# Patient Record
Sex: MALE | Race: WHITE | NOT HISPANIC OR LATINO | Employment: FULL TIME | URBAN - METROPOLITAN AREA
[De-identification: names, ages, dates, MRNs, and addresses within clinical notes are randomized per-mention and may not be internally consistent; named-entity substitution may affect disease eponyms.]

---

## 2017-01-12 ENCOUNTER — OFFICE VISIT (OUTPATIENT)
Dept: MEDICAL GROUP | Facility: MEDICAL CENTER | Age: 42
End: 2017-01-12
Payer: COMMERCIAL

## 2017-01-12 VITALS
HEIGHT: 75 IN | SYSTOLIC BLOOD PRESSURE: 128 MMHG | OXYGEN SATURATION: 98 % | RESPIRATION RATE: 16 BRPM | WEIGHT: 240 LBS | DIASTOLIC BLOOD PRESSURE: 76 MMHG | HEART RATE: 76 BPM | BODY MASS INDEX: 29.84 KG/M2 | TEMPERATURE: 97.8 F

## 2017-01-12 DIAGNOSIS — F41.9 ANXIETY: ICD-10-CM

## 2017-01-12 DIAGNOSIS — R21 RASH: ICD-10-CM

## 2017-01-12 PROCEDURE — 99214 OFFICE O/P EST MOD 30 MIN: CPT | Performed by: NURSE PRACTITIONER

## 2017-01-12 RX ORDER — SULFAMETHOXAZOLE AND TRIMETHOPRIM 800; 160 MG/1; MG/1
1 TABLET ORAL 2 TIMES DAILY
Qty: 20 TAB | Refills: 0 | Status: SHIPPED | OUTPATIENT
Start: 2017-01-12 | End: 2019-01-21

## 2017-01-12 RX ORDER — TRIAMCINOLONE ACETONIDE 1 MG/G
2-4 CREAM TOPICAL 2 TIMES DAILY
Qty: 1 TUBE | Refills: 0 | Status: SHIPPED | OUTPATIENT
Start: 2017-01-12 | End: 2019-01-21

## 2017-01-12 ASSESSMENT — PATIENT HEALTH QUESTIONNAIRE - PHQ9: CLINICAL INTERPRETATION OF PHQ2 SCORE: 0

## 2017-01-12 NOTE — PROGRESS NOTES
"Subjective:     Chief Complaint   Patient presents with   • rash     poss infection     Gabriel Blair is a 41 y.o. male established patient here for evaluation of rash. One week ago he started developing scattered raised red lesions that are mildly irritated and pruritic. He has started a new dietary supplement but otherwise has no new exposures including hygiene products or foods. He states that approximately 15 years ago he developed a MRSA infection that had similar appearance and required long-term antibiotics to resolve. He is concerned that the current lesions indicate return of infection. No fever, nausea, pain, malaise. No open lesions or discharge  He has been feeling quite anxious lately. He has had long-standing history of anxiety which she is managed with exercise and counseling. 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 Denies significant depression, insomnia, hypersomnia, appetite change    Current medicines (including changes today)  Current Outpatient Prescriptions   Medication Sig Dispense Refill   • sulfamethoxazole-trimethoprim (BACTRIM DS) 800-160 MG tablet Take 1 Tab by mouth 2 times a day. 20 Tab 0   • triamcinolone acetonide (KENALOG) 0.1 % Cream Apply 2-4 g to affected area(s) 2 times a day. 1 Tube 0   • acetaminophen-isometheptene-dichloralphonazone (MIDRIN) -325 MG Cap Take 1-2 Caps by mouth every 8 hours as needed. 30 Cap 0     No current facility-administered medications for this visit.     He  has a past medical history of Allergy; Migraine; and Asthma.    ROS included above     Objective:     Blood pressure 128/76, pulse 76, temperature 36.6 °C (97.8 °F), resp. rate 16, height 1.905 m (6' 3\"), weight 108.863 kg (240 lb), SpO2 98 %. Body mass index is 30 kg/(m^2).     Physical Exam:  General: Alert, oriented in no acute distress.  Eye contact is good, speech is normal, affect calm  Lungs: clear to auscultation bilaterally, normal effort, " no wheeze/ rhonchi/ rales.  CV: regular rate and rhythm, S1, S2, no murmur  Ext: no edema. Scattered erythematous papules primarily on the trunk and bilateral arms. No fluid-filled lesions, no drainage, no tenderness    Assessment and Plan:   The following treatment plan was discussed   1. Rash   scattered red papules, no vesicular lesions, no open lesions or drainage. Discussed with him that this rash appears more consistent with dermatitis then infectious cause such as cellulitis. He has, however, very concerned due to his history of MRSA infection. I will provide him with a course of Bactrim. He may also try Kenalog. Advised to stop the new dietary supplement for the next few weeks. Avoid overuse of soap, hot water. Start using that daily moisturizer like Eucerin. Let me know if not gradually resolving  sulfamethoxazole-trimethoprim (BACTRIM DS) 800-160 MG tablet       2.       Anxiety triamcinolone acetonide (KENALOG) 0.1 % Cream  Anxiety may be a significant component today. Encouraged to continue with regular exercise and stress relief efforts. Follow-up if needing further assistance        Followup: as needed         Please note that this dictation was created using voice recognition software. I have worked with consultants from the vendor as well as technical experts from [employer] [facility] [employer] to optimize the interface. I have made every reasonable attempt to correct obvious errors, but I expect that there are errors of grammar and possibly content that I did not discover before finalizing the note.

## 2017-01-12 NOTE — MR AVS SNAPSHOT
"        Gabriel Blair   2017 10:40 AM   Office Visit   MRN: [id number]    Department:  [facility]   Dept Phone:  [phone]    Description:  Male : 1975   Provider:  LILLIAN Bender           Reason for Visit     Laceration poss infection      Allergies as of 2017     Allergen Noted Reactions    Pollen Extract 2016   Rash, Itching, Swelling    Break out on arms      You were diagnosed with     Rash   [[id number]]         Vital Signs     Blood Pressure Pulse Temperature Respirations Height Weight    128/76 mmHg 76 36.6 °C (97.8 °F) 16 1.905 m (6' 3\") 108.863 kg (240 lb)    Body Mass Index Oxygen Saturation Smoking Status             30.00 kg/m2 98% Never Smoker          Basic Information     Date Of Birth Sex Race Ethnicity Preferred Language    1975 Male White Non- English      Problem List              ICD-10-CM Priority Class Noted - Resolved    Mild intermittent asthma without complication J45.20   2016 - Present    Intractable migraine with aura without status migrainosus G43.119   2016 - Present    Obesity (BMI 30-39.9) E66.9   2016 - Present      Health Maintenance        Date Due Completion Dates    IMM PNEUMOCOCCAL 19-64 (ADULT) MEDIUM RISK SERIES (1 of 1 - PPSV23) 1996 ---    IMM INFLUENZA (1) 2016 ---    IMM DTaP/Tdap/Td Vaccine (2 - Td) 2024            Current Immunizations     Tdap Vaccine 2014      Below and/or attached are the medications your provider expects you to take. Review all of your home medications and newly ordered medications with your provider and/or pharmacist. Follow medication instructions as directed by your provider and/or pharmacist. Please keep your medication list with you and share with your provider. Update the information when medications are discontinued, doses are changed, or new medications (including over-the-counter products) are added; and carry medication information at all " times in the event of emergency situations     Allergies:  POLLEN EXTRACT - Rash,Itching,Swelling               Medications  Valid as of: January 12, 2017 - 10:40 AM    Generic Name Brand Name Tablet Size Instructions for use    Isometheptene-Dichloral-APAP (Cap) MIDRIN -325 MG Take 1-2 Caps by mouth every 8 hours as needed.        Sulfamethoxazole-Trimethoprim (Tab) BACTRIM -160 MG Take 1 Tab by mouth 2 times a day.        Triamcinolone Acetonide (Cream) KENALOG 0.1 % Apply 2-4 g to affected area(s) 2 times a day.        .                 Medicines prescribed today were sent to:     [facility] [address] [place] [address], [place] - [address] [facility]    [address] [facility] [address]    Phone: [phone] Fax: [phone]    Open 24 Hours?: No      Medication refill instructions:       If your prescription bottle indicates you have medication refills left, it is not necessary to call your provider’s office. Please contact your pharmacy and they will refill your medication.    If your prescription bottle indicates you do not have any refills left, you may request refills at any time through one of the following ways: The online [employer] system (except Urgent Care), by calling your provider’s office, or by asking your pharmacy to contact your provider’s office with a refill request. Medication refills are processed only during regular business hours and may not be available until the next business day. Your provider may request additional information or to have a follow-up visit with you prior to refilling your medication.   *Please Note: Medication refills are assigned a new Rx number when refilled electronically. Your pharmacy may indicate that no refills were authorized even though a new prescription for the same medication is available at the pharmacy. Please request the medicine by name with the pharmacy before contacting your provider for a refill.            [employer] Access Code: Activation code not generated  Current [employer] Status: Active

## 2019-01-21 ENCOUNTER — OFFICE VISIT (OUTPATIENT)
Dept: MEDICAL GROUP | Facility: MEDICAL CENTER | Age: 44
End: 2019-01-21
Payer: COMMERCIAL

## 2019-01-21 VITALS
WEIGHT: 250 LBS | HEART RATE: 87 BPM | HEIGHT: 75 IN | BODY MASS INDEX: 31.08 KG/M2 | SYSTOLIC BLOOD PRESSURE: 110 MMHG | DIASTOLIC BLOOD PRESSURE: 82 MMHG | RESPIRATION RATE: 16 BRPM | OXYGEN SATURATION: 93 % | TEMPERATURE: 97.6 F

## 2019-01-21 DIAGNOSIS — Z13.29 THYROID DISORDER SCREEN: ICD-10-CM

## 2019-01-21 DIAGNOSIS — Z83.438 FAMILY HISTORY OF HYPERLIPIDEMIA: ICD-10-CM

## 2019-01-21 DIAGNOSIS — E66.9 OBESITY (BMI 30-39.9): ICD-10-CM

## 2019-01-21 DIAGNOSIS — M54.50 CHRONIC LEFT-SIDED LOW BACK PAIN WITHOUT SCIATICA: ICD-10-CM

## 2019-01-21 DIAGNOSIS — G89.29 CHRONIC PAIN OF RIGHT KNEE: ICD-10-CM

## 2019-01-21 DIAGNOSIS — G89.29 CHRONIC LEFT-SIDED LOW BACK PAIN WITHOUT SCIATICA: ICD-10-CM

## 2019-01-21 DIAGNOSIS — J45.20 MILD INTERMITTENT ASTHMA WITHOUT COMPLICATION: ICD-10-CM

## 2019-01-21 DIAGNOSIS — Z13.1 SCREENING FOR DIABETES MELLITUS: ICD-10-CM

## 2019-01-21 DIAGNOSIS — G43.119 INTRACTABLE MIGRAINE WITH AURA WITHOUT STATUS MIGRAINOSUS: ICD-10-CM

## 2019-01-21 DIAGNOSIS — M25.561 CHRONIC PAIN OF RIGHT KNEE: ICD-10-CM

## 2019-01-21 PROCEDURE — 99214 OFFICE O/P EST MOD 30 MIN: CPT | Performed by: NURSE PRACTITIONER

## 2019-01-21 RX ORDER — ALBUTEROL SULFATE 90 UG/1
2 AEROSOL, METERED RESPIRATORY (INHALATION) EVERY 6 HOURS PRN
Qty: 8.5 G | Refills: 5 | Status: SHIPPED | OUTPATIENT
Start: 2019-01-21

## 2019-01-21 ASSESSMENT — PATIENT HEALTH QUESTIONNAIRE - PHQ9: CLINICAL INTERPRETATION OF PHQ2 SCORE: 0

## 2019-01-21 NOTE — ASSESSMENT & PLAN NOTE
Long standing problem with no particular h/o injury  1-2/10 up to 6/10, stiff, sore, L lower back without radiation. exacerbated by exercise or sitting for long periods of time. Some relief with rest. occ takes ibuprofen with some improvement  No LE numbness, tingling, weakness, no saddle anesthesia

## 2019-01-21 NOTE — ASSESSMENT & PLAN NOTE
Gradual weight gain over the last few months. Generally watching his diet but states he likes ice cream.  Doing yoga 4-5 days a week.

## 2019-01-21 NOTE — ASSESSMENT & PLAN NOTE
Report h/o meniscal repair now with ongoing discomfort ranging from low grade to 5/10. Pain is in the lateral joint, worse with exercise or long periods of standing. The joint swells periodically. Flexion is slightly limited and with cracking/ grinding.   He feels his back problems are contributing to the knee pain.  No joint erythema, heat, instability

## 2019-01-22 NOTE — PROGRESS NOTES
Subjective:     Chief Complaint   Patient presents with   • Back Pain     X 3 MONTHS   • Knee Pain   • Medication Refill     INHALER     Gabriel Blair is a 44 y.o. male here today to follow up on:    Mild intermittent asthma without complication  Managing with as needed albuterol. No frequent cough, sob, no waking at night with symptoms    Right knee pain  Report h/o meniscal repair now with ongoing discomfort ranging from low grade to 5/10. Pain is in the lateral joint, worse with exercise or long periods of standing. The joint swells periodically. Flexion is slightly limited and with cracking/ grinding.   He feels his back problems are contributing to the knee pain.  No joint erythema, heat, instability    Chronic left-sided low back pain without sciatica  Long standing problem with no particular h/o injury  1-2/10 up to 6/10, stiff, sore, L lower back without radiation. exacerbated by exercise or sitting for long periods of time. Some relief with rest. occ takes ibuprofen with some improvement  No LE numbness, tingling, weakness, no saddle anesthesia    Obesity (BMI 30-39.9)  Gradual weight gain over the last few months. Generally watching his diet but states he likes ice cream.  Doing yoga 4-5 days a week.     Intractable migraine with aura without status migrainosus  Rare episodes, resolving with midrin. Needing refill at this time. Last prescription 2 years ago       Current medicines (including changes today)  Current Outpatient Prescriptions   Medication Sig Dispense Refill   • albuterol 108 (90 Base) MCG/ACT Aero Soln inhalation aerosol Inhale 2 Puffs by mouth every 6 hours as needed for Shortness of Breath. 8.5 g 5   • acetaminophen-isometheptene-dichloralphonazone (MIDRIN) -325 MG Cap Take 1-2 Caps by mouth every 8 hours as needed for up to 90 days. 30 Cap 0     No current facility-administered medications for this visit.      He  has a past medical history of Allergy; Asthma; and Migraine.    ROS  "included above     Objective:     Blood pressure 110/82, pulse 87, temperature 36.4 °C (97.6 °F), temperature source Temporal, resp. rate 16, height 1.905 m (6' 3\"), weight 113.4 kg (250 lb), SpO2 93 %. Body mass index is 31.25 kg/m².     Physical Exam:  General: Alert, oriented in no acute distress.  Eye contact is good, speech is normal, affect calm  Lungs: clear to auscultation bilaterally, normal effort, no wheeze/ rhonchi/ rales.  CV: regular rate and rhythm, S1, S2, no murmur  Abdomen: soft, nontender  MS: no point tenderness over spinous process, no deformity. Good hamstring flexibility, normal gait. Strength 5/5 in bilateral distal and proximal LE. Negative straight leg raise bilaterally. DTR 2+ bilaterally. No point tenderness over the R knee, no swelling, normal flexion/ extension  Ext: no edema, color normal, vascularity normal, temperature normal    Assessment and Plan:   The following treatment plan was discussed   1. Mild intermittent asthma without complication  Stable with occ use of albuterol  albuterol 108 (90 Base) MCG/ACT Aero Soln inhalation aerosol   2. Chronic pain of right knee  Ongoing knee and back pain > 3 months, interested in physical therapy. Referral placed, if not improving after a few sessions we will obtain imaging. Pt to contact me   REFERRAL TO PHYSICAL THERAPY Reason for Therapy: Eval/Treat/Report   3. Chronic left-sided low back pain without sciatica  REFERRAL TO PHYSICAL THERAPY Reason for Therapy: Eval/Treat/Report   4. Obesity (BMI 30-39.9)  Counseled on diet, exercise, weight loss recommendations   5. Family history of hyperlipidemia  Lipid Profile   6. Screening for diabetes mellitus  BLOOD GLUCOSE   7. Thyroid disorder screen  TSH WITH REFLEX TO FT4   8. Intractable migraine with aura without status migrainosus  Stable with rare use of midrin.  report reviewed, I have determined this prescription to be medically necessary  acetaminophen-isometheptene-dichloralphonazone " (MIDRIN) -325 MG Cap       Followup: pending labs         Please note that this dictation was created using voice recognition software. I have worked with consultants from the vendor as well as technical experts from [facility] to optimize the interface. I have made every reasonable attempt to correct obvious errors, but I expect that there are errors of grammar and possibly content that I did not discover before finalizing the note.

## 2019-01-31 ENCOUNTER — APPOINTMENT (OUTPATIENT)
Dept: PHYSICAL THERAPY | Facility: MEDICAL CENTER | Age: 44
End: 2019-01-31
Payer: COMMERCIAL

## 2020-05-30 ENCOUNTER — APPOINTMENT (OUTPATIENT)
Dept: RADIOLOGY | Facility: MEDICAL CENTER | Age: 45
End: 2020-05-30
Attending: EMERGENCY MEDICINE
Payer: COMMERCIAL

## 2020-05-30 ENCOUNTER — HOSPITAL ENCOUNTER (EMERGENCY)
Facility: MEDICAL CENTER | Age: 45
End: 2020-05-30
Attending: EMERGENCY MEDICINE
Payer: COMMERCIAL

## 2020-05-30 VITALS
TEMPERATURE: 97.2 F | BODY MASS INDEX: 30.48 KG/M2 | DIASTOLIC BLOOD PRESSURE: 74 MMHG | WEIGHT: 245.15 LBS | SYSTOLIC BLOOD PRESSURE: 125 MMHG | HEART RATE: 65 BPM | HEIGHT: 75 IN | OXYGEN SATURATION: 98 % | RESPIRATION RATE: 16 BRPM

## 2020-05-30 DIAGNOSIS — S86.011A STRAIN OF RIGHT ACHILLES TENDON, INITIAL ENCOUNTER: ICD-10-CM

## 2020-05-30 PROCEDURE — 93971 EXTREMITY STUDY: CPT | Mod: RT

## 2020-05-30 PROCEDURE — 99283 EMERGENCY DEPT VISIT LOW MDM: CPT

## 2020-05-30 NOTE — ED TRIAGE NOTES
"Chief Complaint   Patient presents with   • Leg Pain     right leg pain near achilles, present for past 6 days. Pt concerned for possible blood clot.     /84   Pulse 72   Temp 36.2 °C (97.2 °F) (Temporal)   Resp 16   Ht 1.905 m (6' 3\")   Wt 111.2 kg (245 lb 2.4 oz)   SpO2 93%   BMI 30.64 kg/m²     Pt arrived w/ above concern, pt states pain with walking but able to bear partial weight on leg, denies any trauma to leg but takes long walks/jogs everyday.    COVID-19 Screen (-)  "

## 2020-05-30 NOTE — ED PROVIDER NOTES
ED Provider Note    ER PROVIDER NOTE        CHIEF COMPLAINT  Chief Complaint   Patient presents with   • Leg Pain     right leg pain near achilles, present for past 6 days. Pt concerned for possible blood clot.       HPI  Gabrieljustyn Blair is a 45 y.o. male who presents to the emergency department complaining of right sided leg pain.  Patient reports he has had pain to his right calf for the last 6 days.  He denies any injury to the area it just seems sore.  He denies any more proximal or thigh pain.  No focal weakness numbness or tingling.  Denies any chest pain or shortness of breath.  No fevers or chills    No recent travel    REVIEW OF SYSTEMS  Pertinent positives include calf pain. Pertinent negatives include no chest pain or shortness of breath. See HPI for details. All other systems reviewed and are negative.    PAST MEDICAL HISTORY   has a past medical history of Allergy, Asthma, and Migraine.    SURGICAL HISTORY   has a past surgical history that includes open reduction and eye surgery.    FAMILY HISTORY  Family History   Problem Relation Age of Onset   • Arthritis Mother    • Hypertension Mother    • Hyperlipidemia Mother    • Psychiatric Illness Brother    • Alcohol/Drug Brother        SOCIAL HISTORY  Social History     Socioeconomic History   • Marital status: Single     Spouse name: Not on file   • Number of children: Not on file   • Years of education: Not on file   • Highest education level: Not on file   Occupational History   • Not on file   Social Needs   • Financial resource strain: Not on file   • Food insecurity     Worry: Not on file     Inability: Not on file   • Transportation needs     Medical: Not on file     Non-medical: Not on file   Tobacco Use   • Smoking status: Never Smoker   • Smokeless tobacco: Never Used   Substance and Sexual Activity   • Alcohol use: Yes     Alcohol/week: 0.0 oz     Comment: occasional   • Drug use: No   • Sexual activity: Yes   Lifestyle   • Physical activity      "Days per week: Not on file     Minutes per session: Not on file   • Stress: Not on file   Relationships   • Social connections     Talks on phone: Not on file     Gets together: Not on file     Attends Gnosticism service: Not on file     Active member of club or organization: Not on file     Attends meetings of clubs or organizations: Not on file     Relationship status: Not on file   • Intimate partner violence     Fear of current or ex partner: Not on file     Emotionally abused: Not on file     Physically abused: Not on file     Forced sexual activity: Not on file   Other Topics Concern   • Not on file   Social History Narrative   • Not on file      Social History     Substance and Sexual Activity   Drug Use No       CURRENT MEDICATIONS  Home Medications     Reviewed by Jc Trujillo (Pharmacy Tech) on 05/30/20 at 1554  Med List Status: Complete   Medication Last Dose Status   albuterol 108 (90 Base) MCG/ACT Aero Soln inhalation aerosol PRN Active                ALLERGIES  Allergies   Allergen Reactions   • Pollen Extract Rash, Itching and Swelling     Break out on arms   • Penicillins        PHYSICAL EXAM  VITAL SIGNS: /74   Pulse 65   Temp 36.2 °C (97.2 °F) (Temporal)   Resp 16   Ht 1.905 m (6' 3\")   Wt 111.2 kg (245 lb 2.4 oz)   SpO2 98%   BMI 30.64 kg/m²   Pulse ox interpretation: I interpret this pulse ox as normal.    Constitutional: Alert in no apparent distress.  HENT: No signs of trauma, Bilateral external ears normal, Nose normal.   Eyes: Pupils are equal and reactive, Conjunctiva normal, Non-icteric.   Neck: Normal range of motion, No tenderness, Supple, No stridor.   Lymphatic: No lymphadenopathy noted.   Cardiovascular: Regular rate and rhythm, no murmurs.   Skin: Warm, Dry, No erythema, No rash.   Back: No bony tenderness, No CVA tenderness.   Extremities: Intact distal pulses, No edema,No cyanosis,   Musculoskeletal: Tenderness over the right posterior calf at the origin of the " Achilles tendon the gastroc, the pain is reproducible with dorsiflexion of his foot he has full strength with dorsiflexion plantar flexion of the foot.  Nontender through the knee and thigh.  Good range of motion in all major joints. No tenderness to palpation or major deformities noted.   Neurologic: Alert , Normal motor function, Normal sensory function, No focal deficits noted.   Psychiatric: Affect normal, Judgment normal, Mood normal.     DIAGNOSTIC STUDIES / PROCEDURES      RADIOLOGY  US-EXTREMITY VENOUS LOWER UNILAT RIGHT   Final Result      No evidence of right lower extremity deep venous thrombosis.        The radiologist's interpretation of all radiological studies have been reviewed by me.    COURSE & MEDICAL DECISION MAKING  Nursing notes, VS, PMSFHx reviewed in chart.    3:25 PM Patient seen and examined at bedside.  Declines pain medication. Ordered for US to evaluate his symptoms.     Patient reevaluated, updated on results of ultrasound, will plan for discharge      Decision Making:  This is a 45 y.o. male presenting with calf pain.  This does seem to be more proximal Achilles tendinitis given the nature of his symptoms and exam.  Ultrasound was obtained without evidence of DVT.  No findings suggestive of infectious process.  No findings suggestive of Achilles rupture either.  Recommended anti-inflammatories, rest and range of motion activities, as well as potential physical therapy if this continues.     The patient will return for new or worsening symptoms and is stable at the time of discharge.    The patient is referred to a primary physician for blood pressure management, diabetic screening, and for all other preventative health concerns.      DISPOSITION:  Patient will be discharged home in stable condition.    FOLLOW UP:  ULI Bender.R.N.  [address] [facility]  [address]  [facility] [id number] [phone]      As needed      OUTPATIENT MEDICATIONS:  Discharge Medication List as of  5/30/2020  5:56 PM            FINAL IMPRESSION  1. Strain of right Achilles tendon, initial encounter          The note accurately reflects work and decisions made by me.  Maximus Banegas M.D.  5/30/2020  6:32 PM

## 2023-06-25 SDOH — ECONOMIC STABILITY: INCOME INSECURITY: IN THE LAST 12 MONTHS, WAS THERE A TIME WHEN YOU WERE NOT ABLE TO PAY THE MORTGAGE OR RENT ON TIME?: NO

## 2023-06-25 SDOH — ECONOMIC STABILITY: HOUSING INSECURITY: IN THE LAST 12 MONTHS, HOW MANY PLACES HAVE YOU LIVED?: 1

## 2023-06-25 SDOH — HEALTH STABILITY: PHYSICAL HEALTH: ON AVERAGE, HOW MANY DAYS PER WEEK DO YOU ENGAGE IN MODERATE TO STRENUOUS EXERCISE (LIKE A BRISK WALK)?: 5 DAYS

## 2023-06-25 SDOH — ECONOMIC STABILITY: TRANSPORTATION INSECURITY
IN THE PAST 12 MONTHS, HAS THE LACK OF TRANSPORTATION KEPT YOU FROM MEDICAL APPOINTMENTS OR FROM GETTING MEDICATIONS?: NO

## 2023-06-25 SDOH — HEALTH STABILITY: PHYSICAL HEALTH: ON AVERAGE, HOW MANY MINUTES DO YOU ENGAGE IN EXERCISE AT THIS LEVEL?: 60 MIN

## 2023-06-25 SDOH — ECONOMIC STABILITY: HOUSING INSECURITY
IN THE LAST 12 MONTHS, WAS THERE A TIME WHEN YOU DID NOT HAVE A STEADY PLACE TO SLEEP OR SLEPT IN A SHELTER (INCLUDING NOW)?: NO

## 2023-06-25 SDOH — ECONOMIC STABILITY: FOOD INSECURITY: WITHIN THE PAST 12 MONTHS, YOU WORRIED THAT YOUR FOOD WOULD RUN OUT BEFORE YOU GOT MONEY TO BUY MORE.: NEVER TRUE

## 2023-06-25 SDOH — ECONOMIC STABILITY: FOOD INSECURITY: WITHIN THE PAST 12 MONTHS, THE FOOD YOU BOUGHT JUST DIDN'T LAST AND YOU DIDN'T HAVE MONEY TO GET MORE.: NEVER TRUE

## 2023-06-25 SDOH — ECONOMIC STABILITY: INCOME INSECURITY: HOW HARD IS IT FOR YOU TO PAY FOR THE VERY BASICS LIKE FOOD, HOUSING, MEDICAL CARE, AND HEATING?: NOT HARD AT ALL

## 2023-06-25 SDOH — ECONOMIC STABILITY: TRANSPORTATION INSECURITY
IN THE PAST 12 MONTHS, HAS LACK OF RELIABLE TRANSPORTATION KEPT YOU FROM MEDICAL APPOINTMENTS, MEETINGS, WORK OR FROM GETTING THINGS NEEDED FOR DAILY LIVING?: NO

## 2023-06-25 SDOH — ECONOMIC STABILITY: TRANSPORTATION INSECURITY
IN THE PAST 12 MONTHS, HAS LACK OF TRANSPORTATION KEPT YOU FROM MEETINGS, WORK, OR FROM GETTING THINGS NEEDED FOR DAILY LIVING?: NO

## 2023-06-25 SDOH — HEALTH STABILITY: MENTAL HEALTH
STRESS IS WHEN SOMEONE FEELS TENSE, NERVOUS, ANXIOUS, OR CAN'T SLEEP AT NIGHT BECAUSE THEIR MIND IS TROUBLED. HOW STRESSED ARE YOU?: RATHER MUCH

## 2023-06-25 ASSESSMENT — SOCIAL DETERMINANTS OF HEALTH (SDOH)
HOW MANY DRINKS CONTAINING ALCOHOL DO YOU HAVE ON A TYPICAL DAY WHEN YOU ARE DRINKING: 3 OR 4
HOW OFTEN DO YOU ATTEND CHURCH OR RELIGIOUS SERVICES?: NEVER
HOW OFTEN DO YOU ATTEND CHURCH OR RELIGIOUS SERVICES?: NEVER
HOW OFTEN DO YOU GET TOGETHER WITH FRIENDS OR RELATIVES?: NEVER
DO YOU BELONG TO ANY CLUBS OR ORGANIZATIONS SUCH AS CHURCH GROUPS UNIONS, FRATERNAL OR ATHLETIC GROUPS, OR SCHOOL GROUPS?: YES
HOW OFTEN DO YOU HAVE SIX OR MORE DRINKS ON ONE OCCASION: NEVER
HOW OFTEN DO YOU ATTENT MEETINGS OF THE CLUB OR ORGANIZATION YOU BELONG TO?: 1 TO 4 TIMES PER YEAR
ARE YOU MARRIED, WIDOWED, DIVORCED, SEPARATED, NEVER MARRIED, OR LIVING WITH A PARTNER?: NEVER MARRIED
HOW OFTEN DO YOU HAVE A DRINK CONTAINING ALCOHOL: MONTHLY OR LESS
IN A TYPICAL WEEK, HOW MANY TIMES DO YOU TALK ON THE PHONE WITH FAMILY, FRIENDS, OR NEIGHBORS?: TWICE A WEEK
DO YOU BELONG TO ANY CLUBS OR ORGANIZATIONS SUCH AS CHURCH GROUPS UNIONS, FRATERNAL OR ATHLETIC GROUPS, OR SCHOOL GROUPS?: YES
ARE YOU MARRIED, WIDOWED, DIVORCED, SEPARATED, NEVER MARRIED, OR LIVING WITH A PARTNER?: NEVER MARRIED
HOW OFTEN DO YOU GET TOGETHER WITH FRIENDS OR RELATIVES?: NEVER
HOW OFTEN DO YOU ATTENT MEETINGS OF THE CLUB OR ORGANIZATION YOU BELONG TO?: 1 TO 4 TIMES PER YEAR
WITHIN THE PAST 12 MONTHS, YOU WORRIED THAT YOUR FOOD WOULD RUN OUT BEFORE YOU GOT THE MONEY TO BUY MORE: NEVER TRUE
IN A TYPICAL WEEK, HOW MANY TIMES DO YOU TALK ON THE PHONE WITH FAMILY, FRIENDS, OR NEIGHBORS?: TWICE A WEEK
HOW HARD IS IT FOR YOU TO PAY FOR THE VERY BASICS LIKE FOOD, HOUSING, MEDICAL CARE, AND HEATING?: NOT HARD AT ALL

## 2023-06-25 ASSESSMENT — LIFESTYLE VARIABLES
HOW OFTEN DO YOU HAVE SIX OR MORE DRINKS ON ONE OCCASION: NEVER
HOW MANY STANDARD DRINKS CONTAINING ALCOHOL DO YOU HAVE ON A TYPICAL DAY: 3 OR 4
HOW OFTEN DO YOU HAVE A DRINK CONTAINING ALCOHOL: MONTHLY OR LESS
AUDIT-C TOTAL SCORE: 2
SKIP TO QUESTIONS 9-10: 0

## 2023-06-27 ENCOUNTER — OFFICE VISIT (OUTPATIENT)
Dept: MEDICAL GROUP | Facility: LAB | Age: 48
End: 2023-06-27
Payer: COMMERCIAL

## 2023-06-27 VITALS
DIASTOLIC BLOOD PRESSURE: 78 MMHG | HEART RATE: 74 BPM | BODY MASS INDEX: 29.37 KG/M2 | SYSTOLIC BLOOD PRESSURE: 126 MMHG | TEMPERATURE: 96.8 F | RESPIRATION RATE: 16 BRPM | OXYGEN SATURATION: 96 % | HEIGHT: 75 IN | WEIGHT: 236.2 LBS

## 2023-06-27 DIAGNOSIS — E55.9 VITAMIN D DEFICIENCY: ICD-10-CM

## 2023-06-27 DIAGNOSIS — Z12.11 COLON CANCER SCREENING: ICD-10-CM

## 2023-06-27 DIAGNOSIS — Z12.83 SKIN CANCER SCREENING: ICD-10-CM

## 2023-06-27 DIAGNOSIS — Z00.00 HEALTHCARE MAINTENANCE: ICD-10-CM

## 2023-06-27 DIAGNOSIS — G89.29 CHRONIC LEFT-SIDED LOW BACK PAIN WITHOUT SCIATICA: ICD-10-CM

## 2023-06-27 DIAGNOSIS — M54.50 CHRONIC LEFT-SIDED LOW BACK PAIN WITHOUT SCIATICA: ICD-10-CM

## 2023-06-27 DIAGNOSIS — J45.20 MILD INTERMITTENT ASTHMA WITHOUT COMPLICATION: ICD-10-CM

## 2023-06-27 DIAGNOSIS — E66.3 OVERWEIGHT (BMI 25.0-29.9): ICD-10-CM

## 2023-06-27 DIAGNOSIS — G43.119 INTRACTABLE MIGRAINE WITH AURA WITHOUT STATUS MIGRAINOSUS: ICD-10-CM

## 2023-06-27 PROBLEM — M25.561 RIGHT KNEE PAIN: Status: RESOLVED | Noted: 2019-01-21 | Resolved: 2023-06-27

## 2023-06-27 PROCEDURE — 99204 OFFICE O/P NEW MOD 45 MIN: CPT | Performed by: STUDENT IN AN ORGANIZED HEALTH CARE EDUCATION/TRAINING PROGRAM

## 2023-06-27 PROCEDURE — 3078F DIAST BP <80 MM HG: CPT | Performed by: STUDENT IN AN ORGANIZED HEALTH CARE EDUCATION/TRAINING PROGRAM

## 2023-06-27 PROCEDURE — 3074F SYST BP LT 130 MM HG: CPT | Performed by: STUDENT IN AN ORGANIZED HEALTH CARE EDUCATION/TRAINING PROGRAM

## 2023-06-27 ASSESSMENT — ENCOUNTER SYMPTOMS
CHILLS: 0
FEVER: 0
SHORTNESS OF BREATH: 0

## 2023-06-27 ASSESSMENT — PATIENT HEALTH QUESTIONNAIRE - PHQ9: CLINICAL INTERPRETATION OF PHQ2 SCORE: 0

## 2023-06-27 NOTE — PROGRESS NOTES
"Subjective:     CC: establishing with new provider     HPI:   Gabriel presents today for the following;    Problem   Vitamin D Deficiency    Taking supplements occasionally     Overweight (Bmi 25.0-29.9)   Chronic Left-Sided Low Back Pain Without Sciatica    Improved with exercising and losing weight.     Mild Intermittent Asthma Without Complication    Uses albuterol infrequently, when he is sick or heavy exercise       Intractable Migraine With Aura Without Status Migrainosus    Was prescribed medication in the past but never took. He is takes advil which helps      Right Knee Pain (Resolved)   Obesity (Bmi 30-39.9) (Resolved)       Current Outpatient Medications Ordered in Epic   Medication Sig Dispense Refill    albuterol 108 (90 Base) MCG/ACT Aero Soln inhalation aerosol Inhale 2 Puffs by mouth every 6 hours as needed for Shortness of Breath. 8.5 g 5     No current [facility]-ordered facility-administered medications on file.           ROS:  Review of Systems   Constitutional:  Negative for chills and fever.   Respiratory:  Negative for shortness of breath.    Cardiovascular:  Negative for chest pain.       Objective:     Exam:  /78 (BP Location: Right arm, Patient Position: Sitting, BP Cuff Size: Large adult long)   Pulse 74   Temp 36 °C (96.8 °F)   Resp 16   Ht 1.905 m (6' 3\")   Wt 107 kg (236 lb 3.2 oz)   SpO2 96%   BMI 29.52 kg/m²  Body mass index is 29.52 kg/m².    Physical Exam  Constitutional:       General: He is not in acute distress.     Appearance: He is not ill-appearing.   Pulmonary:      Effort: Pulmonary effort is normal.   Neurological:      Mental Status: He is alert.   Psychiatric:         Mood and Affect: Mood normal.         Behavior: Behavior normal.         Thought Content: Thought content normal.         Judgment: Judgment normal.               Assessment & Plan:     Problem List Items Addressed This Visit       Chronic left-sided low back pain without sciatica     Chronic-stable   "       Intractable migraine with aura without status migrainosus    Mild intermittent asthma without complication     Chronic-stable           Overweight (BMI 25.0-29.9)    Vitamin D deficiency    Relevant Orders    VITAMIN D,25 HYDROXY (DEFICIENCY)     Other Visit Diagnoses       Colon cancer screening        Relevant Orders    Referral to GI for Colonoscopy    Skin cancer screening        Relevant Orders    Referral to Dermatology    Healthcare maintenance        Relevant Orders    HEMOGLOBIN A1C    CBC WITH DIFFERENTIAL    Comp Metabolic Panel    TSH WITH REFLEX TO FT4    Lipid Profile                Return in about 4 weeks (around 7/25/2023) for Annual .    Please note that this dictation was created using voice recognition software. I have made every reasonable attempt to correct obvious errors, but I expect that there are errors of grammar and possibly content that I did not discover before finalizing the note.

## 2023-06-29 ENCOUNTER — HOSPITAL ENCOUNTER (OUTPATIENT)
Dept: LAB | Facility: MEDICAL CENTER | Age: 48
End: 2023-06-29
Attending: STUDENT IN AN ORGANIZED HEALTH CARE EDUCATION/TRAINING PROGRAM
Payer: COMMERCIAL

## 2023-06-29 DIAGNOSIS — E55.9 VITAMIN D DEFICIENCY: ICD-10-CM

## 2023-06-29 DIAGNOSIS — Z00.00 HEALTHCARE MAINTENANCE: ICD-10-CM

## 2023-06-29 LAB
25(OH)D3 SERPL-MCNC: 36 NG/ML (ref 30–100)
ALBUMIN SERPL BCP-MCNC: 4.6 G/DL (ref 3.2–4.9)
ALBUMIN/GLOB SERPL: 1.9 G/DL
ALP SERPL-CCNC: 96 U/L (ref 30–99)
ALT SERPL-CCNC: 25 U/L (ref 2–50)
ANION GAP SERPL CALC-SCNC: 11 MMOL/L (ref 7–16)
AST SERPL-CCNC: 18 U/L (ref 12–45)
BASOPHILS # BLD AUTO: 1.6 % (ref 0–1.8)
BASOPHILS # BLD: 0.07 K/UL (ref 0–0.12)
BILIRUB SERPL-MCNC: 0.8 MG/DL (ref 0.1–1.5)
BUN SERPL-MCNC: 22 MG/DL (ref 8–22)
CALCIUM ALBUM COR SERPL-MCNC: 9.2 MG/DL (ref 8.5–10.5)
CALCIUM SERPL-MCNC: 9.7 MG/DL (ref 8.5–10.5)
CHLORIDE SERPL-SCNC: 100 MMOL/L (ref 96–112)
CHOLEST SERPL-MCNC: 274 MG/DL (ref 100–199)
CO2 SERPL-SCNC: 26 MMOL/L (ref 20–33)
CREAT SERPL-MCNC: 1.17 MG/DL (ref 0.5–1.4)
EOSINOPHIL # BLD AUTO: 0.39 K/UL (ref 0–0.51)
EOSINOPHIL NFR BLD: 8.8 % (ref 0–6.9)
ERYTHROCYTE [DISTWIDTH] IN BLOOD BY AUTOMATED COUNT: 38.1 FL (ref 35.9–50)
EST. AVERAGE GLUCOSE BLD GHB EST-MCNC: 100 MG/DL
GFR SERPLBLD CREATININE-BSD FMLA CKD-EPI: 78 ML/MIN/1.73 M 2
GLOBULIN SER CALC-MCNC: 2.4 G/DL (ref 1.9–3.5)
GLUCOSE SERPL-MCNC: 97 MG/DL (ref 65–99)
HBA1C MFR BLD: 5.1 % (ref 4–5.6)
HCT VFR BLD AUTO: 47.9 % (ref 42–52)
HDLC SERPL-MCNC: 38 MG/DL
HGB BLD-MCNC: 16 G/DL (ref 14–18)
IMM GRANULOCYTES # BLD AUTO: 0.01 K/UL (ref 0–0.11)
IMM GRANULOCYTES NFR BLD AUTO: 0.2 % (ref 0–0.9)
LDLC SERPL CALC-MCNC: 184 MG/DL
LYMPHOCYTES # BLD AUTO: 1.76 K/UL (ref 1–4.8)
LYMPHOCYTES NFR BLD: 39.6 % (ref 22–41)
MCH RBC QN AUTO: 28 PG (ref 27–33)
MCHC RBC AUTO-ENTMCNC: 33.4 G/DL (ref 32.3–36.5)
MCV RBC AUTO: 83.7 FL (ref 81.4–97.8)
MONOCYTES # BLD AUTO: 0.48 K/UL (ref 0–0.85)
MONOCYTES NFR BLD AUTO: 10.8 % (ref 0–13.4)
NEUTROPHILS # BLD AUTO: 1.73 K/UL (ref 1.82–7.42)
NEUTROPHILS NFR BLD: 39 % (ref 44–72)
NRBC # BLD AUTO: 0 K/UL
NRBC BLD-RTO: 0 /100 WBC (ref 0–0.2)
PLATELET # BLD AUTO: 230 K/UL (ref 164–446)
PMV BLD AUTO: 11.3 FL (ref 9–12.9)
POTASSIUM SERPL-SCNC: 4.7 MMOL/L (ref 3.6–5.5)
PROT SERPL-MCNC: 7 G/DL (ref 6–8.2)
RBC # BLD AUTO: 5.72 M/UL (ref 4.7–6.1)
SODIUM SERPL-SCNC: 137 MMOL/L (ref 135–145)
TRIGL SERPL-MCNC: 262 MG/DL (ref 0–149)
TSH SERPL DL<=0.005 MIU/L-ACNC: 2.29 UIU/ML (ref 0.38–5.33)
WBC # BLD AUTO: 4.4 K/UL (ref 4.8–10.8)

## 2023-06-29 PROCEDURE — 83036 HEMOGLOBIN GLYCOSYLATED A1C: CPT

## 2023-06-29 PROCEDURE — 84443 ASSAY THYROID STIM HORMONE: CPT

## 2023-06-29 PROCEDURE — 82306 VITAMIN D 25 HYDROXY: CPT

## 2023-06-29 PROCEDURE — 80053 COMPREHEN METABOLIC PANEL: CPT

## 2023-06-29 PROCEDURE — 80061 LIPID PANEL: CPT

## 2023-06-29 PROCEDURE — 85025 COMPLETE CBC W/AUTO DIFF WBC: CPT

## 2023-06-29 PROCEDURE — 36415 COLL VENOUS BLD VENIPUNCTURE: CPT

## 2023-06-30 ENCOUNTER — TELEPHONE (OUTPATIENT)
Dept: MEDICAL GROUP | Facility: LAB | Age: 48
End: 2023-06-30
Payer: COMMERCIAL

## 2023-06-30 NOTE — TELEPHONE ENCOUNTER
----- Message from Syed Raymond D.O. sent at 6/30/2023  3:05 PM PDT -----  If possible can we have the patient schedule an earlier annual exam with me so we can discuss his blood work.

## 2023-07-19 ENCOUNTER — OFFICE VISIT (OUTPATIENT)
Dept: MEDICAL GROUP | Facility: LAB | Age: 48
End: 2023-07-19
Payer: COMMERCIAL

## 2023-07-19 VITALS
RESPIRATION RATE: 16 BRPM | TEMPERATURE: 97.8 F | BODY MASS INDEX: 29.29 KG/M2 | WEIGHT: 235.6 LBS | OXYGEN SATURATION: 96 % | HEIGHT: 75 IN | DIASTOLIC BLOOD PRESSURE: 68 MMHG | HEART RATE: 60 BPM | SYSTOLIC BLOOD PRESSURE: 122 MMHG

## 2023-07-19 DIAGNOSIS — E78.5 DYSLIPIDEMIA: ICD-10-CM

## 2023-07-19 DIAGNOSIS — Z12.5 PROSTATE CANCER SCREENING: ICD-10-CM

## 2023-07-19 PROCEDURE — 99396 PREV VISIT EST AGE 40-64: CPT | Performed by: STUDENT IN AN ORGANIZED HEALTH CARE EDUCATION/TRAINING PROGRAM

## 2023-07-19 PROCEDURE — 3078F DIAST BP <80 MM HG: CPT | Performed by: STUDENT IN AN ORGANIZED HEALTH CARE EDUCATION/TRAINING PROGRAM

## 2023-07-19 PROCEDURE — 3074F SYST BP LT 130 MM HG: CPT | Performed by: STUDENT IN AN ORGANIZED HEALTH CARE EDUCATION/TRAINING PROGRAM

## 2023-07-19 ASSESSMENT — FIBROSIS 4 INDEX: FIB4 SCORE: .72

## 2023-07-19 ASSESSMENT — ENCOUNTER SYMPTOMS
COUGH: 0
VOMITING: 0
CHILLS: 0
ABDOMINAL PAIN: 0
FEVER: 0
SHORTNESS OF BREATH: 0
PALPITATIONS: 0
BLOOD IN STOOL: 0
SPUTUM PRODUCTION: 0

## 2023-07-19 NOTE — ASSESSMENT & PLAN NOTE
New diagnosis   -check lipid panel in 4 months, if no improvement and ct cardiac score concerning considering statin  -if he does have improvement and ct cardiac score is low will continue to monitor

## 2023-07-19 NOTE — PROGRESS NOTES
Subjective:     Subjective:     CC:   Chief Complaint   Patient presents with    Annual Exam       HPI:   Gabriel Blair is a 48 y.o. male who presents for annual exam    Last Colorectal Cancer Screening: scheduled   Last Tdap: 2014  Received HPV series: No  Hx STDs: No    Exercise: strenuous regular exercise, aerobic > 3 hours a week  Diet: eats at home, avoids carbs     He  has a past medical history of Allergy, Asthma, and Migraine.  He  has a past surgical history that includes open reduction and eye surgery.    Family History   Problem Relation Age of Onset    Stroke Mother     Arthritis Mother     Hypertension Mother     Hyperlipidemia Mother     Colon Cancer Father     Psychiatric Illness Brother     Alcohol/Drug Brother      Social History     Tobacco Use    Smoking status: Never    Smokeless tobacco: Never   Vaping Use    Vaping Use: Some days   Substance Use Topics    Alcohol use: Yes     Alcohol/week: 0.0 oz     Comment: occasional    Drug use: No     He  reports being sexually active.    Patient Active Problem List    Diagnosis Date Noted    Dyslipidemia 07/19/2023    Vitamin D deficiency 06/27/2023    Overweight (BMI 25.0-29.9) 06/27/2023    Chronic left-sided low back pain without sciatica 01/21/2019    Mild intermittent asthma without complication 11/30/2016    Intractable migraine with aura without status migrainosus 11/30/2016     Current Outpatient Medications   Medication Sig Dispense Refill    albuterol 108 (90 Base) MCG/ACT Aero Soln inhalation aerosol Inhale 2 Puffs by mouth every 6 hours as needed for Shortness of Breath. 8.5 g 5     No current facility-administered medications for this visit.     Allergies   Allergen Reactions    Pollen Extract Rash, Itching and Swelling     Break out on arms    Penicillins        Review of Systems   Review of Systems   Constitutional:  Negative for chills and fever.   Respiratory:  Negative for cough, sputum production and shortness of breath.   "  Cardiovascular:  Negative for chest pain and palpitations.   Gastrointestinal:  Negative for abdominal pain, blood in stool and vomiting.        Objective:   /68 (BP Location: Right arm, Patient Position: Sitting, BP Cuff Size: Large adult long)   Pulse 60   Temp 36.6 °C (97.8 °F)   Resp 16   Ht 1.905 m (6' 3\")   Wt 107 kg (235 lb 9.6 oz)   SpO2 96%   BMI 29.45 kg/m²      Wt Readings from Last 4 Encounters:   07/19/23 107 kg (235 lb 9.6 oz)   06/27/23 107 kg (236 lb 3.2 oz)   05/30/20 111 kg (245 lb 2.4 oz)   01/21/19 113 kg (250 lb)           Physical Exam:  Physical Exam  Constitutional:       General: He is not in acute distress.     Appearance: Normal appearance. He is not ill-appearing.   HENT:      Head: Normocephalic and atraumatic.      Right Ear: Tympanic membrane and ear canal normal.      Left Ear: Tympanic membrane and ear canal normal.      Mouth/Throat:      Mouth: Mucous membranes are moist.      Pharynx: Oropharynx is clear.   Eyes:      Extraocular Movements: Extraocular movements intact.      Pupils: Pupils are equal, round, and reactive to light.   Neck:      Thyroid: No thyromegaly.   Cardiovascular:      Rate and Rhythm: Normal rate and regular rhythm.      Heart sounds: Normal heart sounds.   Pulmonary:      Effort: Pulmonary effort is normal.      Breath sounds: Normal breath sounds.   Abdominal:      General: Abdomen is flat. Bowel sounds are normal.      Palpations: Abdomen is soft.   Musculoskeletal:      Cervical back: Normal range of motion and neck supple.      Right lower leg: No edema.      Left lower leg: No edema.   Lymphadenopathy:      Cervical: No cervical adenopathy.   Skin:     General: Skin is warm and dry.   Neurological:      General: No focal deficit present.      Mental Status: He is alert.   Psychiatric:         Mood and Affect: Mood normal.         Behavior: Behavior normal.         Thought Content: Thought content normal.         Judgment: Judgment normal. "             Assessment and Plan:     Problem List Items Addressed This Visit       Dyslipidemia     New diagnosis   -check lipid panel in 4 months, if no improvement and ct cardiac score concerning considering statin  -if he does have improvement and ct cardiac score is low will continue to monitor          Relevant Orders    Lipid Profile    CT-CARDIAC SCORING     Other Visit Diagnoses       Prostate cancer screening        Relevant Orders    PROSTATE SPECIFIC AG SCREENING             Health maintenance:    Labs per orders  Immunizations per orders  Patient counseled about skin care, diet, supplements, and exercise.  Discussed diet and exercise     Follow-up: 1 year annual

## 2023-07-21 ENCOUNTER — APPOINTMENT (RX ONLY)
Dept: URBAN - METROPOLITAN AREA CLINIC 15 | Facility: CLINIC | Age: 48
Setting detail: DERMATOLOGY
End: 2023-07-21

## 2023-07-21 DIAGNOSIS — D18.0 HEMANGIOMA: ICD-10-CM

## 2023-07-21 DIAGNOSIS — Z71.89 OTHER SPECIFIED COUNSELING: ICD-10-CM

## 2023-07-21 DIAGNOSIS — L81.4 OTHER MELANIN HYPERPIGMENTATION: ICD-10-CM

## 2023-07-21 DIAGNOSIS — L57.0 ACTINIC KERATOSIS: ICD-10-CM

## 2023-07-21 DIAGNOSIS — D22 MELANOCYTIC NEVI: ICD-10-CM

## 2023-07-21 DIAGNOSIS — L82.1 OTHER SEBORRHEIC KERATOSIS: ICD-10-CM

## 2023-07-21 PROBLEM — D22.39 MELANOCYTIC NEVI OF OTHER PARTS OF FACE: Status: ACTIVE | Noted: 2023-07-21

## 2023-07-21 PROBLEM — D22.4 MELANOCYTIC NEVI OF SCALP AND NECK: Status: ACTIVE | Noted: 2023-07-21

## 2023-07-21 PROBLEM — D22.71 MELANOCYTIC NEVI OF RIGHT LOWER LIMB, INCLUDING HIP: Status: ACTIVE | Noted: 2023-07-21

## 2023-07-21 PROBLEM — D18.01 HEMANGIOMA OF SKIN AND SUBCUTANEOUS TISSUE: Status: ACTIVE | Noted: 2023-07-21

## 2023-07-21 PROCEDURE — ? ADDITIONAL NOTES

## 2023-07-21 PROCEDURE — 99203 OFFICE O/P NEW LOW 30 MIN: CPT | Mod: 25

## 2023-07-21 PROCEDURE — ? PHOTODYNAMIC THERAPY COUNSELING

## 2023-07-21 PROCEDURE — ? OBSERVATION AND MEASURE

## 2023-07-21 PROCEDURE — 17000 DESTRUCT PREMALG LESION: CPT

## 2023-07-21 PROCEDURE — ? PHOTO-DOCUMENTATION

## 2023-07-21 PROCEDURE — ? LIQUID NITROGEN

## 2023-07-21 PROCEDURE — ? ORDER FOR PHOTODYNAMIC THERAPY

## 2023-07-21 PROCEDURE — ? SUNSCREEN RECOMMENDATIONS

## 2023-07-21 PROCEDURE — ? COUNSELING

## 2023-07-21 ASSESSMENT — LOCATION DETAILED DESCRIPTION DERM
LOCATION DETAILED: RIGHT NASAL SIDEWALL
LOCATION DETAILED: RIGHT INFERIOR CENTRAL MALAR CHEEK
LOCATION DETAILED: LEFT SUPERIOR OCCIPITAL SCALP
LOCATION DETAILED: RIGHT PROXIMAL CALF
LOCATION DETAILED: LEFT ANTERIOR DISTAL UPPER ARM
LOCATION DETAILED: MID-FRONTAL SCALP
LOCATION DETAILED: LEFT PROXIMAL DORSAL FOREARM
LOCATION DETAILED: RIGHT MEDIAL INFERIOR CHEST

## 2023-07-21 ASSESSMENT — LOCATION SIMPLE DESCRIPTION DERM
LOCATION SIMPLE: LEFT FOREARM
LOCATION SIMPLE: RIGHT CALF
LOCATION SIMPLE: LEFT UPPER ARM
LOCATION SIMPLE: CHEST
LOCATION SIMPLE: RIGHT CHEEK
LOCATION SIMPLE: POSTERIOR SCALP
LOCATION SIMPLE: ANTERIOR SCALP
LOCATION SIMPLE: RIGHT NOSE

## 2023-07-21 ASSESSMENT — LOCATION ZONE DERM
LOCATION ZONE: LEG
LOCATION ZONE: NOSE
LOCATION ZONE: SCALP
LOCATION ZONE: ARM
LOCATION ZONE: TRUNK
LOCATION ZONE: FACE

## 2023-07-21 NOTE — PROCEDURE: ADDITIONAL NOTES
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 The sooner we can get pt in for PDT would be ideal.
Render Risk Assessment In Note?: no
Detail Level: Detailed

## 2023-07-21 NOTE — PROCEDURE: ORDER FOR PHOTODYNAMIC THERAPY
Hands Incubation Time: 2 Hours
Location: Face, Ears and Scalp
Face And Ears Incubation Time: 1 Hour
Frequency Of Pdt: Single Treatment
Consent: The procedure and risks were reviewed with the patient including but not limited to: burning, pigmentary changes, pain, blistering, scabbing, redness, and the possibility of needing numerous treatments. Strict photoprotection after the procedure was also discussed.
Face And Scalp Incubation Time: 1 Hour for the face and 2 Hours for the scalp
Detail Level: Simple
History Of Hsv (Optional): No
Photosensitizer: Levulan
Pdt Type: ELI-U

## 2023-07-21 NOTE — PROCEDURE: COUNSELING
Detail Level: Generalized
Sunscreen Recommendation Label Override: Broad Spectrum Sunscreen minimum SPF 30+
Detail Level: Detailed

## 2023-07-21 NOTE — PROCEDURE: LIQUID NITROGEN
Show Applicator Variable?: Yes
Render Note In Bullet Format When Appropriate: No
Post-Care Instructions: I reviewed with the patient in detail post-care instructions. Patient is to wear sunprotection, and avoid picking at any of the treated lesions. Pt may apply Vaseline to crusted or scabbing areas.
Application Tool (Optional): Cry-AC
Duration Of Freeze Thaw-Cycle (Seconds): 5
Consent: The patient's consent was obtained including but not limited to risks of crusting, scabbing, blistering, scarring, darker or lighter pigmentary change, recurrence, incomplete removal and infection.
Number Of Freeze-Thaw Cycles: 3 freeze-thaw cycles
Detail Level: Zone

## 2023-07-25 ENCOUNTER — HOSPITAL ENCOUNTER (OUTPATIENT)
Dept: RADIOLOGY | Facility: MEDICAL CENTER | Age: 48
End: 2023-07-25
Attending: STUDENT IN AN ORGANIZED HEALTH CARE EDUCATION/TRAINING PROGRAM
Payer: COMMERCIAL

## 2023-07-25 DIAGNOSIS — E78.5 DYSLIPIDEMIA: ICD-10-CM

## 2023-07-25 PROCEDURE — 4410556 CT-CARDIAC SCORING (SELF PAY ONLY)

## 2023-07-27 ENCOUNTER — APPOINTMENT (RX ONLY)
Dept: URBAN - METROPOLITAN AREA CLINIC 15 | Facility: CLINIC | Age: 48
Setting detail: DERMATOLOGY
End: 2023-07-27

## 2023-07-27 DIAGNOSIS — L57.0 ACTINIC KERATOSIS: ICD-10-CM

## 2023-07-27 PROCEDURE — 96567 PDT DSTR PRMLG LES SKN: CPT

## 2023-07-27 PROCEDURE — ? PHOTODYNAMIC THERAPY COUNSELING

## 2023-07-27 PROCEDURE — ? PDT: BLUE

## 2023-07-27 PROCEDURE — ? PHOTO-DOCUMENTATION

## 2023-07-27 ASSESSMENT — LOCATION DETAILED DESCRIPTION DERM
LOCATION DETAILED: NASAL INFRATIP
LOCATION DETAILED: MEDIAL FRONTAL SCALP
LOCATION DETAILED: LEFT SCAPHA
LOCATION DETAILED: RIGHT SCAPHA

## 2023-07-27 ASSESSMENT — LOCATION ZONE DERM
LOCATION ZONE: EAR
LOCATION ZONE: NOSE
LOCATION ZONE: SCALP

## 2023-07-27 ASSESSMENT — LOCATION SIMPLE DESCRIPTION DERM
LOCATION SIMPLE: LEFT EAR
LOCATION SIMPLE: RIGHT EAR
LOCATION SIMPLE: FRONTAL SCALP
LOCATION SIMPLE: NOSE

## 2023-07-27 NOTE — PROCEDURE: PDT: BLUE
Pre-Procedure Text: The treatment areas were cleaned and prepped in the usual fashion.
Incubation Time: 02:00
Illumination Time: 00:16:40, 2x. once on face and once on scalp
Who Performed The Pdt (Staff): Arianna Bonilla MA
Show Medical Necessity In Plan?: Yes
Light Source: Nicholas-U
Who Performed The Pdt?: Performed by Nurse, MA or Aesthetician ([id number])
Treatment Number: 1
Post-Care Instructions: I reviewed with the patient in detail post-care instructions. Patient is to avoid sunlight for the next 2 days, and wear sun protection. Patients may expect sunburn like redness, discomfort and scabbing.
Anesthesia Type: 1% lidocaine with epinephrine
Medical Necessity: Precancerous Lesions
History Of Hsv?: No
Incubation Start Time: 1:15
Detail Level: Zone
Incubation End Time: 3:15
Which Photosensitizer Was Used: Levulan
Number Of Kerasticks/Tubes Billed For: 2
Consent: Written consent obtained.  The risks were reviewed with the patient including but not limited to: Pigmentary changes, pain, blistering, scabbing, redness, and the remote possibility of scarring.
Total Number Of Aks Treated (Optional To Report): 0

## 2023-10-24 ENCOUNTER — OFFICE VISIT (OUTPATIENT)
Dept: MEDICAL GROUP | Facility: LAB | Age: 48
End: 2023-10-24
Payer: COMMERCIAL

## 2023-10-24 VITALS
HEART RATE: 56 BPM | DIASTOLIC BLOOD PRESSURE: 68 MMHG | SYSTOLIC BLOOD PRESSURE: 126 MMHG | BODY MASS INDEX: 29.19 KG/M2 | WEIGHT: 234.79 LBS | RESPIRATION RATE: 16 BRPM | OXYGEN SATURATION: 94 % | TEMPERATURE: 97.6 F | HEIGHT: 75 IN

## 2023-10-24 DIAGNOSIS — G44.019 EPISODIC CLUSTER HEADACHE, NOT INTRACTABLE: ICD-10-CM

## 2023-10-24 PROCEDURE — 3078F DIAST BP <80 MM HG: CPT | Performed by: STUDENT IN AN ORGANIZED HEALTH CARE EDUCATION/TRAINING PROGRAM

## 2023-10-24 PROCEDURE — 3074F SYST BP LT 130 MM HG: CPT | Performed by: STUDENT IN AN ORGANIZED HEALTH CARE EDUCATION/TRAINING PROGRAM

## 2023-10-24 PROCEDURE — 99214 OFFICE O/P EST MOD 30 MIN: CPT | Performed by: STUDENT IN AN ORGANIZED HEALTH CARE EDUCATION/TRAINING PROGRAM

## 2023-10-24 RX ORDER — SUMATRIPTAN 50 MG/1
50 TABLET, FILM COATED ORAL
Qty: 10 TABLET | Refills: 3 | Status: SHIPPED | OUTPATIENT
Start: 2023-10-24 | End: 2023-10-25

## 2023-10-24 ASSESSMENT — FIBROSIS 4 INDEX: FIB4 SCORE: .72

## 2023-10-24 ASSESSMENT — ENCOUNTER SYMPTOMS
FEVER: 0
SHORTNESS OF BREATH: 0
CHILLS: 0
HEADACHES: 1

## 2023-10-24 NOTE — PROGRESS NOTES
"Subjective:     CC: headaches     HPI:   Gabriel presents today for the following;    Problem   Cluster Headache Syndrome    Was prescribed medication in the past but never took. He is takes advil which helps. He did not tolerate Excedrin in the past. His headaches are usually behind his eyes and bilateral, his eyes do get teary     10/24/24  -patient had a headache 5 days ago, the next day he had a his covid vaccine and has headaches all weekend that have not responded to advil. He has also been stress recently, his weather is also changing which normally gets his symptoms going. He has tried Imitrex in the past           Current Outpatient Medications Ordered in Epic   Medication Sig Dispense Refill    SUMAtriptan (IMITREX) 50 MG Tab Take 1 Tablet by mouth one time as needed for Migraine for up to 1 dose. If symptoms persist or return, may repeat dose (usually same as first dose) after =2 hours. Maximum dose: 100 mg/dose; 200 mg per 24 hours 10 Tablet 3    albuterol 108 (90 Base) MCG/ACT Aero Soln inhalation aerosol Inhale 2 Puffs by mouth every 6 hours as needed for Shortness of Breath. 8.5 g 5     No current [facility]-ordered facility-administered medications on file.           ROS:  Review of Systems   Constitutional:  Negative for chills and fever.   Respiratory:  Negative for shortness of breath.    Cardiovascular:  Negative for chest pain.   Neurological:  Positive for headaches.       Objective:     Exam:  /68 (BP Location: Right arm, Patient Position: Sitting, BP Cuff Size: Adult long)   Pulse (!) 56   Temp 36.4 °C (97.6 °F)   Resp 16   Ht 1.905 m (6' 3\")   Wt 107 kg (234 lb 12.6 oz)   SpO2 94%   BMI 29.35 kg/m²  Body mass index is 29.35 kg/m².    Physical Exam  Constitutional:       General: He is not in acute distress.     Appearance: He is not ill-appearing.   Pulmonary:      Effort: Pulmonary effort is normal.   Neurological:      Mental Status: He is alert.   Psychiatric:         Mood and " Affect: Mood normal.         Behavior: Behavior normal.         Thought Content: Thought content normal.         Judgment: Judgment normal.               Assessment & Plan:     Problem List Items Addressed This Visit       Cluster headache syndrome     Chronic-worsening  -patient will told in the past he has migraines but I believe that he may actually have cluster headaches instead since his headaches are usually behind his eyes and bilateral, his eyes do get teary   -will provide imitrex   -neurology referral incase symptoms do not improve          Relevant Medications    SUMAtriptan (IMITREX) 50 MG Tab    Other Relevant Orders    Referral to Neurology               Please note that this dictation was created using voice recognition software. I have made every reasonable attempt to correct obvious errors, but I expect that there are errors of grammar and possibly content that I did not discover before finalizing the note.

## 2023-10-24 NOTE — ASSESSMENT & PLAN NOTE
Chronic-worsening  -patient will told in the past he has migraines but I believe that he may actually have cluster headaches instead since his headaches are usually behind his eyes and bilateral, his eyes do get teary   -will provide imitrex   -neurology referral incase symptoms do not improve

## 2023-10-25 ENCOUNTER — TELEPHONE (OUTPATIENT)
Dept: MEDICAL GROUP | Facility: LAB | Age: 48
End: 2023-10-25
Payer: COMMERCIAL

## 2023-10-25 DIAGNOSIS — G44.019 EPISODIC CLUSTER HEADACHE, NOT INTRACTABLE: ICD-10-CM

## 2023-10-25 RX ORDER — NARATRIPTAN 1 MG/1
1 TABLET ORAL
Qty: 10 TABLET | Refills: 1 | Status: SHIPPED | OUTPATIENT
Start: 2023-10-25 | End: 2023-10-26

## 2023-10-25 NOTE — TELEPHONE ENCOUNTER
[facility]: SUMAtriptan (IMITREX) 50 MG Tab   This medication is not covered. Alternative preferred Rizatriptantaaries, rizatriptantanan, naratriptantabmgoran, eletriptantabmgoran.

## 2023-10-26 DIAGNOSIS — G44.019 EPISODIC CLUSTER HEADACHE, NOT INTRACTABLE: ICD-10-CM

## 2023-10-26 RX ORDER — SUMATRIPTAN 50 MG/1
50 TABLET, FILM COATED ORAL
Qty: 10 TABLET | Refills: 3 | Status: SHIPPED | OUTPATIENT
Start: 2023-10-26

## 2023-10-30 ENCOUNTER — TELEPHONE (OUTPATIENT)
Dept: HEALTH INFORMATION MANAGEMENT | Facility: OTHER | Age: 48
End: 2023-10-30
Payer: COMMERCIAL

## 2024-03-21 ENCOUNTER — APPOINTMENT (RX ONLY)
Dept: URBAN - METROPOLITAN AREA CLINIC 15 | Facility: CLINIC | Age: 49
Setting detail: DERMATOLOGY
End: 2024-03-21

## 2024-03-21 DIAGNOSIS — L57.0 ACTINIC KERATOSIS: ICD-10-CM

## 2024-03-21 DIAGNOSIS — Z71.89 OTHER SPECIFIED COUNSELING: ICD-10-CM

## 2024-03-21 PROBLEM — D48.5 NEOPLASM OF UNCERTAIN BEHAVIOR OF SKIN: Status: ACTIVE | Noted: 2024-03-21

## 2024-03-21 PROCEDURE — ? COUNSELING

## 2024-03-21 PROCEDURE — ? BIOPSY BY SHAVE METHOD

## 2024-03-21 PROCEDURE — 11102 TANGNTL BX SKIN SINGLE LES: CPT

## 2024-03-21 PROCEDURE — ? TREATMENT REGIMEN

## 2024-03-21 PROCEDURE — ? PRESCRIPTION

## 2024-03-21 PROCEDURE — ? SUNSCREEN RECOMMENDATIONS

## 2024-03-21 PROCEDURE — ? MEDICATION COUNSELING

## 2024-03-21 PROCEDURE — 99213 OFFICE O/P EST LOW 20 MIN: CPT | Mod: 25

## 2024-03-21 ASSESSMENT — LOCATION SIMPLE DESCRIPTION DERM: LOCATION SIMPLE: LEFT FOREHEAD

## 2024-03-21 ASSESSMENT — LOCATION DETAILED DESCRIPTION DERM: LOCATION DETAILED: LEFT SUPERIOR FOREHEAD

## 2024-03-21 ASSESSMENT — LOCATION ZONE DERM: LOCATION ZONE: FACE

## 2024-03-21 NOTE — PROCEDURE: TREATMENT REGIMEN
Detail Level: Detailed
Initiate Treatment: Luis / 5fu Bid\\nCal / 5fu (20g 5% fluorouracil, 20g 0.005% calcipotriene)\\nSig: Apply to scalp twice daily for 5 days

## 2024-03-21 NOTE — PROCEDURE: MIPS QUALITY
Quality 486: Dermatitis - Improvement In Patient-Reported Itch Severity: Itch severity assessment score is reduced by 2 or more points from the initial (index) assessment score to the follow-up visit score
Quality 431: Preventive Care And Screening: Unhealthy Alcohol Use - Screening: Patient not identified as an unhealthy alcohol user when screened for unhealthy alcohol use using a systematic screening method
Quality 226: Preventive Care And Screening: Tobacco Use: Screening And Cessation Intervention: Patient screened for tobacco use and is an ex/non-smoker
Detail Level: Simple
Quality 137: Melanoma: Continuity Of Care - Recall System: Patient information entered into a recall system that includes: target date for the next exam specified AND a process to follow up with patients regarding missed or unscheduled appointments
Quality 130: Documentation Of Current Medications In The Medical Record: Current Medications Documented

## 2024-03-21 NOTE — PROCEDURE: BIOPSY BY SHAVE METHOD
Detail Level: Detailed
Depth Of Biopsy: dermis
Was A Bandage Applied: Yes
Size Of Lesion In Cm: 0.5
X Size Of Lesion In Cm: 0
Biopsy Type: H and E
Biopsy Method: Dermablade
Anesthesia Type: 0.5% lidocaine without epinephrine
Anesthesia Volume In Cc: 0.3
Hemostasis: Electrocautery
Wound Care: Petrolatum
Dressing: bandage
Destruction After The Procedure: No
Type Of Destruction Used: Curettage
Curettage Text: The wound bed was treated with curettage after the biopsy was performed.
Cryotherapy Text: The wound bed was treated with cryotherapy after the biopsy was performed.
Electrodesiccation Text: The wound bed was treated with electrodesiccation after the biopsy was performed.
Electrodesiccation And Curettage Text: The wound bed was treated with electrodesiccation and curettage after the biopsy was performed.
Silver Nitrate Text: The wound bed was treated with silver nitrate after the biopsy was performed.
Lab: [id number]
Lab Facility: 
Consent: Written consent was obtained and risks were reviewed including but not limited to scarring, infection, bleeding, scabbing, incomplete removal, nerve damage and allergy to anesthesia.
Post-Care Instructions: I reviewed with the patient in detail post-care instructions. Patient is to keep the biopsy site dry overnight, and then apply bacitracin twice daily until healed. Patient may apply hydrogen peroxide soaks to remove any crusting.
Notification Instructions: Patient will be notified of biopsy results. However, patient instructed to call the office if not contacted within 2 weeks.
Billing Type: Third-Party Bill
Information: Selecting Yes will display possible errors in your note based on the variables you have selected. This validation is only offered as a suggestion for you. PLEASE NOTE THAT THE VALIDATION TEXT WILL BE REMOVED WHEN YOU FINALIZE YOUR NOTE. IF YOU WANT TO FAX A PRELIMINARY NOTE YOU WILL NEED TO TOGGLE THIS TO 'NO' IF YOU DO NOT WANT IT IN YOUR FAXED NOTE.

## 2024-03-21 NOTE — PROCEDURE: MEDICATION COUNSELING
Topical Steroids Counseling: I discussed with the patient that prolonged use of topical steroids can result in the increased appearance of superficial blood vessels (telangiectasias), lightening (hypopigmentation) and thinning of the skin (atrophy).  Patient understands to avoid using high potency steroids in skin folds, the groin or the face.  The patient verbalized understanding of the proper use and possible adverse effects of topical steroids.  All of the patient's questions and concerns were addressed.
Glycopyrrolate Pregnancy And Lactation Text: This medication is Pregnancy Category B and is considered safe during pregnancy. It is unknown if it is excreted breast milk.
Terbinafine Pregnancy And Lactation Text: This medication is Pregnancy Category B and is considered safe during pregnancy. It is also excreted in breast milk and breast feeding isn't recommended.
Rinvoq Pregnancy And Lactation Text: Based on animal studies, Rinvoq may cause embryo-fetal harm when administered to pregnant women.  The medication should not be used in pregnancy.  Breastfeeding is not recommended during treatment and for 6 days after the last dose.
Klisyri Counseling:  I discussed with the patient the risks of Klisyri including but not limited to erythema, scaling, itching, weeping, crusting, and pain.
SSKI Counseling:  I discussed with the patient the risks of SSKI including but not limited to thyroid abnormalities, metallic taste, GI upset, fever, headache, acne, arthralgias, paraesthesias, lymphadenopathy, easy bleeding, arrhythmias, and allergic reaction.
Cephalexin Pregnancy And Lactation Text: This medication is Pregnancy Category B and considered safe during pregnancy.  It is also excreted in breast milk but can be used safely for shorter doses.
Albendazole Pregnancy And Lactation Text: This medication is Pregnancy Category C and it isn't known if it is safe during pregnancy. It is also excreted in breast milk.
Cibinqo Counseling: I discussed with the patient the risks of Cibinqo therapy including but not limited to common cold, nausea, headache, cold sores, increased blood CPK levels, dizziness, UTIs, fatigue, acne, and vomitting. Live vaccines should be avoided.  This medication has been linked to serious infections; higher rate of mortality; malignancy and lymphoproliferative disorders; major adverse cardiovascular events; thrombosis; thrombocytopenia and lymphopenia; lipid elevations; and retinal detachment.
Clindamycin Counseling: I counseled the patient regarding use of clindamycin as an antibiotic for prophylactic and/or therapeutic purposes. Clindamycin is active against numerous classes of bacteria, including skin bacteria. Side effects may include nausea, diarrhea, gastrointestinal upset, rash, hives, yeast infections, and in rare cases, colitis.
Ivermectin Counseling:  Patient instructed to take medication on an empty stomach with a full glass of water.  Patient informed of potential adverse effects including but not limited to nausea, diarrhea, dizziness, itching, and swelling of the extremities or lymph nodes.  The patient verbalized understanding of the proper use and possible adverse effects of ivermectin.  All of the patient's questions and concerns were addressed.
Zoryve Counseling:  I discussed with the patient that Zoryve is not for use in the eyes, mouth or vagina. The most commonly reported side effects include diarrhea, headache, insomnia, application site pain, upper respiratory tract infections, and urinary tract infections.  All of the patient's questions and concerns were addressed.
Olanzapine Pregnancy And Lactation Text: This medication is pregnancy category C.   There are no adequate and well controlled trials with olanzapine in pregnant females.  Olanzapine should be used during pregnancy only if the potential benefit justifies the potential risk to the fetus.   In a study in lactating healthy women, olanzapine was excreted in breast milk.  It is recommended that women taking olanzapine should not breast feed.
Protopic Counseling: Patient may experience a mild burning sensation during topical application. Protopic is not approved in children less than 2 years of age. There have been case reports of hematologic and skin malignancies in patients using topical calcineurin inhibitors although causality is questionable.
Benzoyl Peroxide Counseling: Patient counseled that medicine may cause skin irritation and bleach clothing.  In the event of skin irritation, the patient was advised to reduce the amount of the drug applied or use it less frequently.   The patient verbalized understanding of the proper use and possible adverse effects of benzoyl peroxide.  All of the patient's questions and concerns were addressed.
Cyclophosphamide Counseling:  I discussed with the patient the risks of cyclophosphamide including but not limited to hair loss, hormonal abnormalities, decreased fertility, abdominal pain, diarrhea, nausea and vomiting, bone marrow suppression and infection. The patient understands that monitoring is required while taking this medication.
Rituxan Counseling:  I discussed with the patient the risks of Rituxan infusions. Side effects can include infusion reactions, severe drug rashes including mucocutaneous reactions, reactivation of latent hepatitis and other infections and rarely progressive multifocal leukoencephalopathy.  All of the patient's questions and concerns were addressed.
Fluconazole Counseling:  Patient counseled regarding adverse effects of fluconazole including but not limited to headache, diarrhea, nausea, upset stomach, liver function test abnormalities, taste disturbance, and stomach pain.  There is a rare possibility of liver failure that can occur when taking fluconazole.  The patient understands that monitoring of LFTs and kidney function test may be required, especially at baseline. The patient verbalized understanding of the proper use and possible adverse effects of fluconazole.  All of the patient's questions and concerns were addressed.
Stelara Pregnancy And Lactation Text: This medication is Pregnancy Category B and is considered safe during pregnancy. It is unknown if this medication is excreted in breast milk.
Drysol Pregnancy And Lactation Text: This medication is considered safe during pregnancy and breast feeding.
Isotretinoin Counseling: Patient should get monthly blood tests, not donate blood, not drive at night if vision affected, not share medication, and not undergo elective surgery for 6 months after tx completed. Side effects reviewed, pt to contact office should one occur.
Finasteride Pregnancy And Lactation Text: This medication is absolutely contraindicated during pregnancy. It is unknown if it is excreted in breast milk.
Minocycline Pregnancy And Lactation Text: This medication is Pregnancy Category D and not consider safe during pregnancy. It is also excreted in breast milk.
Libtayo Counseling- I discussed with the patient the risks of Libtayo including but not limited to nausea, vomiting, diarrhea, and bone or muscle pain.  The patient verbalized understanding of the proper use and possible adverse effects of Libtayo.  All of the patient's questions and concerns were addressed.
Clofazimine Counseling:  I discussed with the patient the risks of clofazimine including but not limited to skin and eye pigmentation, liver damage, nausea/vomiting, gastrointestinal bleeding and allergy.
Include Pregnancy/Lactation Warning?: No
Soolantra Pregnancy And Lactation Text: This medication is Pregnancy Category C. This medication is considered safe during breast feeding.
Dupixent Pregnancy And Lactation Text: This medication likely crosses the placenta but the risk for the fetus is uncertain. This medication is excreted in breast milk.
Quinolones Counseling:  I discussed with the patient the risks of fluoroquinolones including but not limited to GI upset, allergic reaction, drug rash, diarrhea, dizziness, photosensitivity, yeast infections, liver function test abnormalities, tendonitis/tendon rupture.
Topical Retinoid counseling:  Patient advised to apply a pea-sized amount only at bedtime and wait 30 minutes after washing their face before applying.  If too drying, patient may add a non-comedogenic moisturizer. The patient verbalized understanding of the proper use and possible adverse effects of retinoids.  All of the patient's questions and concerns were addressed.
Elidel Counseling: Patient may experience a mild burning sensation during topical application. Elidel is not approved in children less than 2 years of age. There have been case reports of hematologic and skin malignancies in patients using topical calcineurin inhibitors although causality is questionable.
Sski Pregnancy And Lactation Text: This medication is Pregnancy Category D and isn't considered safe during pregnancy. It is excreted in breast milk.
Libtayo Pregnancy And Lactation Text: This medication is contraindicated in pregnancy and when breast feeding.
Clofazimine Pregnancy And Lactation Text: This medication is Pregnancy Category C and isn't considered safe during pregnancy. It is excreted in breast milk.
Taltz Counseling: I discussed with the patient the risks of ixekizumab including but not limited to immunosuppression, serious infections, worsening of inflammatory bowel disease and drug reactions.  The patient understands that monitoring is required including a PPD at baseline and must alert us or the primary physician if symptoms of infection or other concerning signs are noted.
Sotyktu Counseling:  I discussed the most common side effects of Sotyktu including: common cold, sore throat, sinus infections, cold sores, canker sores, folliculitis, and acne.? I also discussed more serious side effects of Sotyktu including but not limited to: serious allergic reactions; increased risk for infections such as TB; cancers such as lymphomas; rhabdomyolysis and elevated CPK; and elevated triglycerides and liver enzymes.?
Cibinqo Pregnancy And Lactation Text: It is unknown if this medication will adversely affect pregnancy or breast feeding.  You should not take this medication if you are currently pregnant or planning a pregnancy or while breastfeeding.
Hydroxychloroquine Counseling:  I discussed with the patient that a baseline ophthalmologic exam is needed at the start of therapy and every year thereafter while on therapy. A CBC may also be warranted for monitoring.  The side effects of this medication were discussed with the patient, including but not limited to agranulocytosis, aplastic anemia, seizures, rashes, retinopathy, and liver toxicity. Patient instructed to call the office should any adverse effect occur.  The patient verbalized understanding of the proper use and possible adverse effects of Plaquenil.  All the patient's questions and concerns were addressed.
Klisyri Pregnancy And Lactation Text: It is unknown if this medication can harm a developing fetus or if it is excreted in breast milk.
Topical Steroids Applications Pregnancy And Lactation Text: Most topical steroids are considered safe to use during pregnancy and lactation.  Any topical steroid applied to the breast or nipple should be washed off before breastfeeding.
Litfulo Counseling: I discussed with the patient the risks of Litfulo therapy including but not limited to upper respiratory tract infections, shingles, cold sores, and nausea. Live vaccines should be avoided.  This medication has been linked to serious infections; higher rate of mortality; malignancy and lymphoproliferative disorders; major adverse cardiovascular events; thrombosis; gastrointestinal perforations; neutropenia; lymphopenia; anemia; liver enzyme elevations; and lipid elevations.
Zoryve Pregnancy And Lactation Text: It is unknown if this medication can cause problems during pregnancy and breastfeeding.
Rituxan Pregnancy And Lactation Text: This medication is Pregnancy Category C and it isn't know if it is safe during pregnancy. It is unknown if this medication is excreted in breast milk but similar antibodies are known to be excreted.
Cyclophosphamide Pregnancy And Lactation Text: This medication is Pregnancy Category D and it isn't considered safe during pregnancy. This medication is excreted in breast milk.
Minoxidil Counseling: Minoxidil is a topical medication which can increase blood flow where it is applied. It is uncertain how this medication increases hair growth. Side effects are uncommon and include stinging and allergic reactions.
Clindamycin Pregnancy And Lactation Text: This medication can be used in pregnancy if certain situations. Clindamycin is also present in breast milk.
Hydroxychloroquine Pregnancy And Lactation Text: This medication has been shown to cause fetal harm but it isn't assigned a Pregnancy Risk Category. There are small amounts excreted in breast milk.
Topical Sulfur Applications Counseling: Topical Sulfur Counseling: Patient counseled that this medication may cause skin irritation or allergic reactions.  In the event of skin irritation, the patient was advised to reduce the amount of the drug applied or use it less frequently.   The patient verbalized understanding of the proper use and possible adverse effects of topical sulfur application.  All of the patient's questions and concerns were addressed.
Sotyktu Pregnancy And Lactation Text: There is insufficient data to evaluate whether or not Sotyktu is safe to use during pregnancy.? ?It is not known if Sotyktu passes into breast milk and whether or not it is safe to use when breastfeeding.??
Benzoyl Peroxide Pregnancy And Lactation Text: This medication is Pregnancy Category C. It is unknown if benzoyl peroxide is excreted in breast milk.
Oral Minoxidil Counseling- I discussed with the patient the risks of oral minoxidil including but not limited to shortness of breath, swelling of the feet or ankles, dizziness, lightheadedness, unwanted hair growth and allergic reaction.  The patient verbalized understanding of the proper use and possible adverse effects of oral minoxidil.  All of the patient's questions and concerns were addressed.
Fluconazole Pregnancy And Lactation Text: This medication is Pregnancy Category C and it isn't know if it is safe during pregnancy. It is also excreted in breast milk.
Isotretinoin Pregnancy And Lactation Text: This medication is Pregnancy Category X and is considered extremely dangerous during pregnancy. It is unknown if it is excreted in breast milk.
Birth Control Pills Counseling: Birth Control Pill Counseling: I discussed with the patient the potential side effects of OCPs including but not limited to increased risk of stroke, heart attack, thrombophlebitis, deep venous thrombosis, hepatic adenomas, breast changes, GI upset, headaches, and depression.  The patient verbalized understanding of the proper use and possible adverse effects of OCPs. All of the patient's questions and concerns were addressed.
Cimetidine Counseling:  I discussed with the patient the risks of Cimetidine including but not limited to gynecomastia, headache, diarrhea, nausea, drowsiness, arrhythmias, pancreatitis, skin rashes, psychosis, bone marrow suppression and kidney toxicity.
Enbrel Counseling:  I discussed with the patient the risks of etanercept including but not limited to myelosuppression, immunosuppression, autoimmune hepatitis, demyelinating diseases, lymphoma, and infections.  The patient understands that monitoring is required including a PPD at baseline and must alert us or the primary physician if symptoms of infection or other concerning signs are noted.
Protopic Pregnancy And Lactation Text: This medication is Pregnancy Category C. It is unknown if this medication is excreted in breast milk when applied topically.
Odomzo Counseling- I discussed with the patient the risks of Odomzo including but not limited to nausea, vomiting, diarrhea, constipation, weight loss, changes in the sense of taste, decreased appetite, muscle spasms, and hair loss.  The patient verbalized understanding of the proper use and possible adverse effects of Odomzo.  All of the patient's questions and concerns were addressed.
Colchicine Counseling:  Patient counseled regarding adverse effects including but not limited to stomach upset (nausea, vomiting, stomach pain, or diarrhea).  Patient instructed to limit alcohol consumption while taking this medication.  Colchicine may reduce blood counts especially with prolonged use.  The patient understands that monitoring of kidney function and blood counts may be required, especially at baseline. The patient verbalized understanding of the proper use and possible adverse effects of colchicine.  All of the patient's questions and concerns were addressed.
Thalidomide Counseling: I discussed with the patient the risks of thalidomide including but not limited to birth defects, anxiety, weakness, chest pain, dizziness, cough and severe allergy.
Griseofulvin Counseling:  I discussed with the patient the risks of griseofulvin including but not limited to photosensitivity, cytopenia, liver damage, nausea/vomiting and severe allergy.  The patient understands that this medication is best absorbed when taken with a fatty meal (e.g., ice cream or french fries).
Topical Retinoid Pregnancy And Lactation Text: This medication is Pregnancy Category C. It is unknown if this medication is excreted in breast milk.
Oral Minoxidil Pregnancy And Lactation Text: This medication should only be used when clearly needed if you are pregnant, attempting to become pregnant or breast feeding.
High Dose Vitamin A Counseling: Side effects reviewed, pt to contact office should one occur.
Taltz Pregnancy And Lactation Text: The risk during pregnancy and breastfeeding is uncertain with this medication.
Topical Sulfur Applications Pregnancy And Lactation Text: This medication is considered safe during pregnancy and breast feeding secondary to limited systemic absorption.
Low Dose Naltrexone Counseling- I discussed with the patient the potential risks and side effects of low dose naltrexone including but not limited to: more vivid dreams, headaches, nausea, vomiting, abdominal pain, fatigue, dizziness, and anxiety.
Minoxidil Pregnancy And Lactation Text: This medication has not been assigned a Pregnancy Risk Category but animal studies failed to show danger with the topical medication. It is unknown if the medication is excreted in breast milk.
Siliq Counseling:  I discussed with the patient the risks of Siliq including but not limited to new or worsening depression, suicidal thoughts and behavior, immunosuppression, malignancy, posterior leukoencephalopathy syndrome, and serious infections.  The patient understands that monitoring is required including a PPD at baseline and must alert us or the primary physician if symptoms of infection or other concerning signs are noted. There is also a special program designed to monitor depression which is required with Siliq.
Cyclosporine Counseling:  I discussed with the patient the risks of cyclosporine including but not limited to hypertension, gingival hyperplasia,myelosuppression, immunosuppression, liver damage, kidney damage, neurotoxicity, lymphoma, and serious infections. The patient understands that monitoring is required including baseline blood pressure, CBC, CMP, lipid panel and uric acid, and then 1-2 times monthly CMP and blood pressure.
Carac Counseling:  I discussed with the patient the risks of Carac including but not limited to erythema, scaling, itching, weeping, crusting, and pain.
Birth Control Pills Pregnancy And Lactation Text: This medication should be avoided if pregnant and for the first 30 days post-partum.
Xeljanz Counseling: I discussed with the patient the risks of Xeljanz therapy including increased risk of infection, liver issues, headache, diarrhea, or cold symptoms. Live vaccines should be avoided. They were instructed to call if they have any problems.
Litfulo Pregnancy And Lactation Text: Based on animal studies, Lifulo may cause embryo-fetal harm when administered to pregnant women.  The medication should not be used in pregnancy.  Breastfeeding is not recommended during treatment.
Doxycycline Counseling:  Patient counseled regarding possible photosensitivity and increased risk for sunburn.  Patient instructed to avoid sunlight, if possible.  When exposed to sunlight, patients should wear protective clothing, sunglasses, and sunscreen.  The patient was instructed to call the office immediately if the following severe adverse effects occur:  hearing changes, easy bruising/bleeding, severe headache, or vision changes.  The patient verbalized understanding of the proper use and possible adverse effects of doxycycline.  All of the patient's questions and concerns were addressed.
Qbrexza Counseling:  I discussed with the patient the risks of Qbrexza including but not limited to headache, mydriasis, blurred vision, dry eyes, nasal dryness, dry mouth, dry throat, dry skin, urinary hesitation, and constipation.  Local skin reactions including erythema, burning, stinging, and itching can also occur.
Zyclara Counseling:  I discussed with the patient the risks of imiquimod including but not limited to erythema, scaling, itching, weeping, crusting, and pain.  Patient understands that the inflammatory response to imiquimod is variable from person to person and was educated regarded proper titration schedule.  If flu-like symptoms develop, patient knows to discontinue the medication and contact us.
Doxepin Counseling:  Patient advised that the medication is sedating and not to drive a car after taking this medication. Patient informed of potential adverse effects including but not limited to dry mouth, urinary retention, and blurry vision.  The patient verbalized understanding of the proper use and possible adverse effects of doxepin.  All of the patient's questions and concerns were addressed.
Azithromycin Counseling:  I discussed with the patient the risks of azithromycin including but not limited to GI upset, allergic reaction, drug rash, diarrhea, and yeast infections.
Doxycycline Pregnancy And Lactation Text: This medication is Pregnancy Category D and not consider safe during pregnancy. It is also excreted in breast milk but is considered safe for shorter treatment courses.
Humira Counseling:  I discussed with the patient the risks of adalimumab including but not limited to myelosuppression, immunosuppression, autoimmune hepatitis, demyelinating diseases, lymphoma, and serious infections.  The patient understands that monitoring is required including a PPD at baseline and must alert us or the primary physician if symptoms of infection or other concerning signs are noted.
Qbrexza Pregnancy And Lactation Text: There is no available data on Qbrexza use in pregnant women.  There is no available data on Qbrexza use in lactation.
Carac Pregnancy And Lactation Text: This medication is Pregnancy Category X and contraindicated in pregnancy and in women who may become pregnant. It is unknown if this medication is excreted in breast milk.
Rifampin Counseling: I discussed with the patient the risks of rifampin including but not limited to liver damage, kidney damage, red-orange body fluids, nausea/vomiting and severe allergy.
Cyclosporine Pregnancy And Lactation Text: This medication is Pregnancy Category C and it isn't know if it is safe during pregnancy. This medication is excreted in breast milk.
Otezla Counseling: The side effects of Otezla were discussed with the patient, including but not limited to worsening or new depression, weight loss, diarrhea, nausea, upper respiratory tract infection, and headache. Patient instructed to call the office should any adverse effect occur.  The patient verbalized understanding of the proper use and possible adverse effects of Otezla.  All the patient's questions and concerns were addressed.
Griseofulvin Pregnancy And Lactation Text: This medication is Pregnancy Category X and is known to cause serious birth defects. It is unknown if this medication is excreted in breast milk but breast feeding should be avoided.
Spironolactone Counseling: Patient advised regarding risks of diarrhea, abdominal pain, hyperkalemia, birth defects (for female patients), liver toxicity and renal toxicity. The patient may need blood work to monitor liver and kidney function and potassium levels while on therapy. The patient verbalized understanding of the proper use and possible adverse effects of spironolactone.  All of the patient's questions and concerns were addressed.
Thalidomide Pregnancy And Lactation Text: This medication is Pregnancy Category X and is absolutely contraindicated during pregnancy. It is unknown if it is excreted in breast milk.
Eucrisa Counseling: Patient may experience a mild burning sensation during topical application. Eucrisa is not approved in children less than 3 months of age.
Tremfya Counseling: I discussed with the patient the risks of guselkumab including but not limited to immunosuppression, serious infections, and drug reactions.  The patient understands that monitoring is required including a PPD at baseline and must alert us or the primary physician if symptoms of infection or other concerning signs are noted.
Adbry Counseling: I discussed with the patient the risks of tralokinumab including but not limited to eye infection and irritation, cold sores, injection site reactions, worsening of asthma, allergic reactions and increased risk of parasitic infection.  Live vaccines should be avoided while taking tralokinumab. The patient understands that monitoring is required and they must alert us or the primary physician if symptoms of infection or other concerning signs are noted.
High Dose Vitamin A Pregnancy And Lactation Text: High dose vitamin A therapy is contraindicated during pregnancy and breast feeding.
Tazorac Counseling:  Patient advised that medication is irritating and drying.  Patient may need to apply sparingly and wash off after an hour before eventually leaving it on overnight.  The patient verbalized understanding of the proper use and possible adverse effects of tazorac.  All of the patient's questions and concerns were addressed.
Tazorac Pregnancy And Lactation Text: This medication is not safe during pregnancy. It is unknown if this medication is excreted in breast milk.
Wartpeel Counseling:  I discussed with the patient the risks of Wartpeel including but not limited to erythema, scaling, itching, weeping, crusting, and pain.
Dapsone Counseling: I discussed with the patient the risks of dapsone including but not limited to hemolytic anemia, agranulocytosis, rashes, methemoglobinemia, kidney failure, peripheral neuropathy, headaches, GI upset, and liver toxicity.  Patients who start dapsone require monitoring including baseline LFTs and weekly CBCs for the first month, then every month thereafter.  The patient verbalized understanding of the proper use and possible adverse effects of dapsone.  All of the patient's questions and concerns were addressed.
Tranexamic Acid Counseling:  Patient advised of the small risk of bleeding problems with tranexamic acid. They were also instructed to call if they developed any nausea, vomiting or diarrhea. All of the patient's questions and concerns were addressed.
Adbry Pregnancy And Lactation Text: It is unknown if this medication will adversely affect pregnancy or breast feeding.
Topical Clindamycin Pregnancy And Lactation Text: This medication is Pregnancy Category B and is considered safe during pregnancy. It is unknown if it is excreted in breast milk.
Low Dose Naltrexone Pregnancy And Lactation Text: Naltrexone is pregnancy category C.  There have been no adequate and well-controlled studies in pregnant women.  It should be used in pregnancy only if the potential benefit justifies the potential risk to the fetus.   Limited data indicates that naltrexone is minimally excreted into breastmilk.
Xelseanz Pregnancy And Lactation Text: This medication is Pregnancy Category D and is not considered safe during pregnancy.  The risk during breast feeding is also uncertain.
Olumiant Counseling: I discussed with the patient the risks of Olumiant therapy including but not limited to upper respiratory tract infections, shingles, cold sores, and nausea. Live vaccines should be avoided.  This medication has been linked to serious infections; higher rate of mortality; malignancy and lymphoproliferative disorders; major adverse cardiovascular events; thrombosis; gastrointestinal perforations; neutropenia; lymphopenia; anemia; liver enzyme elevations; and lipid elevations.
Mirvaso Counseling: Mirvaso is a topical medication which can decrease superficial blood flow where applied. Side effects are uncommon and include stinging, redness and allergic reactions.
Erythromycin Counseling:  I discussed with the patient the risks of erythromycin including but not limited to GI upset, allergic reaction, drug rash, diarrhea, increase in liver enzymes, and yeast infections.
Otezla Pregnancy And Lactation Text: This medication is Pregnancy Category C and it isn't known if it is safe during pregnancy. It is unknown if it is excreted in breast milk.
Methotrexate Counseling:  Patient counseled regarding adverse effects of methotrexate including but not limited to nausea, vomiting, abnormalities in liver function tests. Patients may develop mouth sores, rash, diarrhea, and abnormalities in blood counts. The patient understands that monitoring is required including LFT's and blood counts.  There is a rare possibility of scarring of the liver and lung problems that can occur when taking methotrexate. Persistent nausea, loss of appetite, pale stools, dark urine, cough, and shortness of breath should be reported immediately. Patient advised to discontinue methotrexate treatment at least three months before attempting to become pregnant.  I discussed the need for folate supplements while taking methotrexate.  These supplements can decrease side effects during methotrexate treatment. The patient verbalized understanding of the proper use and possible adverse effects of methotrexate.  All of the patient's questions and concerns were addressed.
Rhofade Counseling: Rhofade is a topical medication which can decrease superficial blood flow where applied. Side effects are uncommon and include stinging, redness and allergic reactions.
Niacinamide Counseling: I recommended taking niacin or niacinamide, also know as vitamin B3, twice daily. Recent evidence suggests that taking vitamin B3 (500 mg twice daily) can reduce the risk of actinic keratoses and non-melanoma skin cancers. Side effects of vitamin B3 include flushing and headache.
Calcipotriene Counseling:  I discussed with the patient the risks of calcipotriene including but not limited to erythema, scaling, itching, and irritation.
Topical Ketoconazole Counseling: Patient counseled that this medication may cause skin irritation or allergic reactions.  In the event of skin irritation, the patient was advised to reduce the amount of the drug applied or use it less frequently.   The patient verbalized understanding of the proper use and possible adverse effects of ketoconazole.  All of the patient's questions and concerns were addressed.
Itraconazole Counseling:  I discussed with the patient the risks of itraconazole including but not limited to liver damage, nausea/vomiting, neuropathy, and severe allergy.  The patient understands that this medication is best absorbed when taken with acidic beverages such as non-diet cola or ginger ale.  The patient understands that monitoring is required including baseline LFTs and repeat LFTs at intervals.  The patient understands that they are to contact us or the primary physician if concerning signs are noted.
Doxepin Pregnancy And Lactation Text: This medication is Pregnancy Category C and it isn't known if it is safe during pregnancy. It is also excreted in breast milk and breast feeding isn't recommended.
Simponi Counseling:  I discussed with the patient the risks of golimumab including but not limited to myelosuppression, immunosuppression, autoimmune hepatitis, demyelinating diseases, lymphoma, and serious infections.  The patient understands that monitoring is required including a PPD at baseline and must alert us or the primary physician if symptoms of infection or other concerning signs are noted.
Spironolactone Pregnancy And Lactation Text: This medication can cause feminization of the male fetus and should be avoided during pregnancy. The active metabolite is also found in breast milk.
Azithromycin Pregnancy And Lactation Text: This medication is considered safe during pregnancy and is also secreted in breast milk.
Rifampin Pregnancy And Lactation Text: This medication is Pregnancy Category C and it isn't know if it is safe during pregnancy. It is also excreted in breast milk and should not be used if you are breast feeding.
Bimzelx Counseling:  I discussed with the patient the risks of Bimzelx including but not limited to depression, immunosuppression, allergic reactions and infections.  The patient understands that monitoring is required including a PPD at baseline and must alert us or the primary physician if symptoms of infection or other concerning signs are noted.
Sarecycline Counseling: Patient advised regarding possible photosensitivity and discoloration of the teeth, skin, lips, tongue and gums.  Patient instructed to avoid sunlight, if possible.  When exposed to sunlight, patients should wear protective clothing, sunglasses, and sunscreen.  The patient was instructed to call the office immediately if the following severe adverse effects occur:  hearing changes, easy bruising/bleeding, severe headache, or vision changes.  The patient verbalized understanding of the proper use and possible adverse effects of sarecycline.  All of the patient's questions and concerns were addressed.
Tranexamic Acid Pregnancy And Lactation Text: It is unknown if this medication is safe during pregnancy or breast feeding.
Dapsone Pregnancy And Lactation Text: This medication is Pregnancy Category C and is not considered safe during pregnancy or breast feeding.
Topical Clindamycin Counseling: Patient counseled that this medication may cause skin irritation or allergic reactions.  In the event of skin irritation, the patient was advised to reduce the amount of the drug applied or use it less frequently.   The patient verbalized understanding of the proper use and possible adverse effects of clindamycin.  All of the patient's questions and concerns were addressed.
Hydroquinone Counseling:  Patient advised that medication may result in skin irritation, lightening (hypopigmentation), dryness, and burning.  In the event of skin irritation, the patient was advised to reduce the amount of the drug applied or use it less frequently.  Rarely, spots that are treated with hydroquinone can become darker (pseudoochronosis).  Should this occur, patient instructed to stop medication and call the office. The patient verbalized understanding of the proper use and possible adverse effects of hydroquinone.  All of the patient's questions and concerns were addressed.
Mirvaso Pregnancy And Lactation Text: This medication has not been assigned a Pregnancy Risk Category. It is unknown if the medication is excreted in breast milk.
Dutasteride Male Counseling: Dustasteride Counseling:  I discussed with the patient the risks of use of dutasteride including but not limited to decreased libido, decreased ejaculate volume, and gynecomastia. Women who can become pregnant should not handle medication.  All of the patient's questions and concerns were addressed.
Calcipotriene Pregnancy And Lactation Text: The use of this medication during pregnancy or lactation is not recommended as there is insufficient data.
Xolair Counseling:  Patient informed of potential adverse effects including but not limited to fever, muscle aches, rash and allergic reactions.  The patient verbalized understanding of the proper use and possible adverse effects of Xolair.  All of the patient's questions and concerns were addressed.
Opioid Counseling: I discussed with the patient the potential side effects of opioids including but not limited to addiction, altered mental status, and depression. I stressed avoiding alcohol, benzodiazepines, muscle relaxants and sleep aids unless specifically okayed by a physician. The patient verbalized understanding of the proper use and possible adverse effects of opioids. All of the patient's questions and concerns were addressed. They were instructed to flush the remaining pills down the toilet if they did not need them for pain.
Opzelura Counseling:  I discussed with the patient the risks of Opzelura including but not limited to nasopharngitis, bronchitis, ear infection, eosinophila, hives, diarrhea, folliculitis, tonsillitis, and rhinorrhea.  Taken orally, this medication has been linked to serious infections; higher rate of mortality; malignancy and lymphoproliferative disorders; major adverse cardiovascular events; thrombosis; thrombocytopenia, anemia, and neutropenia; and lipid elevations.
Aklief counseling:  Patient advised to apply a pea-sized amount only at bedtime and wait 30 minutes after washing their face before applying.  If too drying, patient may add a non-comedogenic moisturizer.  The most commonly reported side effects including irritation, redness, scaling, dryness, stinging, burning, itching, and increased risk of sunburn.  The patient verbalized understanding of the proper use and possible adverse effects of retinoids.  All of the patient's questions and concerns were addressed.
Erythromycin Pregnancy And Lactation Text: This medication is Pregnancy Category B and is considered safe during pregnancy. It is also excreted in breast milk.
Azathioprine Counseling:  I discussed with the patient the risks of azathioprine including but not limited to myelosuppression, immunosuppression, hepatotoxicity, lymphoma, and infections.  The patient understands that monitoring is required including baseline LFTs, Creatinine, possible TPMP genotyping and weekly CBCs for the first month and then every 2 weeks thereafter.  The patient verbalized understanding of the proper use and possible adverse effects of azathioprine.  All of the patient's questions and concerns were addressed.
Winlevi Counseling:  I discussed with the patient the risks of topical clascoterone including but not limited to erythema, scaling, itching, and stinging. Patient voiced their understanding.
Niacinamide Pregnancy And Lactation Text: These medications are considered safe during pregnancy.
Dutasteride Female Counseling: Dutasteride Counseling:  I discussed with the patient the risks of use of dutasteride including but not limited to decreased libido and sexual dysfunction. Explained the teratogenic nature of the medication and stressed the importance of not getting pregnant during treatment. All of the patient's questions and concerns were addressed.
Methotrexate Pregnancy And Lactation Text: This medication is Pregnancy Category X and is known to cause fetal harm. This medication is excreted in breast milk.
Cantharidin Counseling:  I discussed with the patient the risks of Cantharidin including but not limited to pain, redness, burning, itching, and blistering.
Oxybutynin Counseling:  I discussed with the patient the risks of oxybutynin including but not limited to skin rash, drowsiness, dry mouth, difficulty urinating, and blurred vision.
Ilumya Counseling: I discussed with the patient the risks of tildrakizumab including but not limited to immunosuppression, malignancy, posterior leukoencephalopathy syndrome, and serious infections.  The patient understands that monitoring is required including a PPD at baseline and must alert us or the primary physician if symptoms of infection or other concerning signs are noted.
Bactrim Counseling:  I discussed with the patient the risks of sulfa antibiotics including but not limited to GI upset, allergic reaction, drug rash, diarrhea, dizziness, photosensitivity, and yeast infections.  Rarely, more serious reactions can occur including but not limited to aplastic anemia, agranulocytosis, methemoglobinemia, blood dyscrasias, liver or kidney failure, lung infiltrates or desquamative/blistering drug rashes.
Acitretin Counseling:  I discussed with the patient the risks of acitretin including but not limited to hair loss, dry lips/skin/eyes, liver damage, hyperlipidemia, depression/suicidal ideation, photosensitivity.  Serious rare side effects can include but are not limited to pancreatitis, pseudotumor cerebri, bony changes, clot formation/stroke/heart attack.  Patient understands that alcohol is contraindicated since it can result in liver toxicity and significantly prolong the elimination of the drug by many years.
Hydroxyzine Counseling: Patient advised that the medication is sedating and not to drive a car after taking this medication.  Patient informed of potential adverse effects including but not limited to dry mouth, urinary retention, and blurry vision.  The patient verbalized understanding of the proper use and possible adverse effects of hydroxyzine.  All of the patient's questions and concerns were addressed.
Solaraze Counseling:  I discussed with the patient the risks of Solaraze including but not limited to erythema, scaling, itching, weeping, crusting, and pain.
5-Fu Counseling: 5-Fluorouracil Counseling:  I discussed with the patient the risks of 5-fluorouracil including but not limited to erythema, scaling, itching, weeping, crusting, and pain.
Ketoconazole Counseling:   Patient counseled regarding improving absorption with orange juice.  Adverse effects include but are not limited to breast enlargement, headache, diarrhea, nausea, upset stomach, liver function test abnormalities, taste disturbance, and stomach pain.  There is a rare possibility of liver failure that can occur when taking ketoconazole. The patient understands that monitoring of LFTs may be required, especially at baseline. The patient verbalized understanding of the proper use and possible adverse effects of ketoconazole.  All of the patient's questions and concerns were addressed.
Skyrizi Counseling: I discussed with the patient the risks of risankizumab-rzaa including but not limited to immunosuppression, and serious infections.  The patient understands that monitoring is required including a PPD at baseline and must alert us or the primary physician if symptoms of infection or other concerning signs are noted.
Prednisone Counseling:  I discussed with the patient the risks of prolonged use of prednisone including but not limited to weight gain, insomnia, osteoporosis, mood changes, diabetes, susceptibility to infection, glaucoma and high blood pressure.  In cases where prednisone use is prolonged, patients should be monitored with blood pressure checks, serum glucose levels and an eye exam.  Additionally, the patient may need to be placed on GI prophylaxis, PCP prophylaxis, and calcium and vitamin D supplementation and/or a bisphosphonate.  The patient verbalized understanding of the proper use and the possible adverse effects of prednisone.  All of the patient's questions and concerns were addressed.
Hydroxyzine Pregnancy And Lactation Text: This medication is not safe during pregnancy and should not be taken. It is also excreted in breast milk and breast feeding isn't recommended.
Gabapentin Counseling: I discussed with the patient the risks of gabapentin including but not limited to dizziness, somnolence, fatigue and ataxia.
Valtrex Counseling: I discussed with the patient the risks of valacyclovir including but not limited to kidney damage, nausea, vomiting and severe allergy.  The patient understands that if the infection seems to be worsening or is not improving, they are to call.
Opioid Pregnancy And Lactation Text: These medications can lead to premature delivery and should be avoided during pregnancy. These medications are also present in breast milk in small amounts.
Bimzelx Pregnancy And Lactation Text: This medication crosses the placenta and the safety is uncertain during pregnancy. It is unknown if this medication is present in breast milk.
Xolair Pregnancy And Lactation Text: This medication is Pregnancy Category B and is considered safe during pregnancy. This medication is excreted in breast milk.
Cantharidin Pregnancy And Lactation Text: This medication has not been proven safe during pregnancy. It is unknown if this medication is excreted in breast milk.
Winlevi Pregnancy And Lactation Text: This medication is considered safe during pregnancy and breastfeeding.
Azathioprine Pregnancy And Lactation Text: This medication is Pregnancy Category D and isn't considered safe during pregnancy. It is unknown if this medication is excreted in breast milk.
Nsaids Counseling: NSAID Counseling: I discussed with the patient that NSAIDs should be taken with food. Prolonged use of NSAIDs can result in the development of stomach ulcers.  Patient advised to stop taking NSAIDs if abdominal pain occurs.  The patient verbalized understanding of the proper use and possible adverse effects of NSAIDs.  All of the patient's questions and concerns were addressed.
Acitretin Pregnancy And Lactation Text: This medication is Pregnancy Category X and should not be given to women who are pregnant or may become pregnant in the future. This medication is excreted in breast milk.
Opzelura Pregnancy And Lactation Text: There is insufficient data to evaluate drug-associated risk for major birth defects, miscarriage, or other adverse maternal or fetal outcomes.  There is a pregnancy registry that monitors pregnancy outcomes in pregnant persons exposed to the medication during pregnancy.  It is unknown if this medication is excreted in breast milk.  Do not breastfeed during treatment and for about 4 weeks after the last dose.
Aklief Pregnancy And Lactation Text: It is unknown if this medication is safe to use during pregnancy.  It is unknown if this medication is excreted in breast milk.  Breastfeeding women should use the topical cream on the smallest area of the skin for the shortest time needed while breastfeeding.  Do not apply to nipple and areola.
Olumiant Pregnancy And Lactation Text: Based on animal studies, Olumiant may cause embryo-fetal harm when administered to pregnant women.  The medication should not be used in pregnancy.  Breastfeeding is not recommended during treatment.
Valtrex Pregnancy And Lactation Text: this medication is Pregnancy Category B and is considered safe during pregnancy. This medication is not directly found in breast milk but it's metabolite acyclovir is present.
Bactrim Pregnancy And Lactation Text: This medication is Pregnancy Category D and is known to cause fetal risk.  It is also excreted in breast milk.
Cosentyx Counseling:  I discussed with the patient the risks of Cosentyx including but not limited to worsening of Crohn's disease, immunosuppression, allergic reactions and infections.  The patient understands that monitoring is required including a PPD at baseline and must alert us or the primary physician if symptoms of infection or other concerning signs are noted.
Dutasteride Pregnancy And Lactation Text: This medication is absolutely contraindicated in women, especially during pregnancy and breast feeding. Feminization of male fetuses is possible if taking while pregnant.
Metronidazole Counseling:  I discussed with the patient the risks of metronidazole including but not limited to seizures, nausea/vomiting, a metallic taste in the mouth, nausea/vomiting and severe allergy.
Metronidazole Pregnancy And Lactation Text: This medication is Pregnancy Category B and considered safe during pregnancy.  It is also excreted in breast milk.
Arava Counseling:  Patient counseled regarding adverse effects of Arava including but not limited to nausea, vomiting, abnormalities in liver function tests. Patients may develop mouth sores, rash, diarrhea, and abnormalities in blood counts. The patient understands that monitoring is required including LFTs and blood counts.  There is a rare possibility of scarring of the liver and lung problems that can occur when taking methotrexate. Persistent nausea, loss of appetite, pale stools, dark urine, cough, and shortness of breath should be reported immediately. Patient advised to discontinue Arava treatment and consult with a physician prior to attempting conception. The patient will have to undergo a treatment to eliminate Arava from the body prior to conception.
Propranolol Counseling:  I discussed with the patient the risks of propranolol including but not limited to low heart rate, low blood pressure, low blood sugar, restlessness and increased cold sensitivity. They should call the office if they experience any of these side effects.
Solaraze Pregnancy And Lactation Text: This medication is Pregnancy Category B and is considered safe. There is some data to suggest avoiding during the third trimester. It is unknown if this medication is excreted in breast milk.
Erivedge Counseling- I discussed with the patient the risks of Erivedge including but not limited to nausea, vomiting, diarrhea, constipation, weight loss, changes in the sense of taste, decreased appetite, muscle spasms, and hair loss.  The patient verbalized understanding of the proper use and possible adverse effects of Erivedge.  All of the patient's questions and concerns were addressed.
Ketoconazole Pregnancy And Lactation Text: This medication is Pregnancy Category C and it isn't know if it is safe during pregnancy. It is also excreted in breast milk and breast feeding isn't recommended.
Imiquimod Counseling:  I discussed with the patient the risks of imiquimod including but not limited to erythema, scaling, itching, weeping, crusting, and pain.  Patient understands that the inflammatory response to imiquimod is variable from person to person and was educated regarded proper titration schedule.  If flu-like symptoms develop, patient knows to discontinue the medication and contact us.
Cimzia Counseling:  I discussed with the patient the risks of Cimzia including but not limited to immunosuppression, allergic reactions and infections.  The patient understands that monitoring is required including a PPD at baseline and must alert us or the primary physician if symptoms of infection or other concerning signs are noted.
Tetracycline Counseling: Patient counseled regarding possible photosensitivity and increased risk for sunburn.  Patient instructed to avoid sunlight, if possible.  When exposed to sunlight, patients should wear protective clothing, sunglasses, and sunscreen.  The patient was instructed to call the office immediately if the following severe adverse effects occur:  hearing changes, easy bruising/bleeding, severe headache, or vision changes.  The patient verbalized understanding of the proper use and possible adverse effects of tetracycline.  All of the patient's questions and concerns were addressed. Patient understands to avoid pregnancy while on therapy due to potential birth defects.
Topical Metronidazole Counseling: Metronidazole is a topical antibiotic medication. You may experience burning, stinging, redness, or allergic reactions.  Please call our office if you develop any problems from using this medication.
Terbinafine Counseling: Patient counseling regarding adverse effects of terbinafine including but not limited to headache, diarrhea, rash, upset stomach, liver function test abnormalities, itching, taste/smell disturbance, nausea, abdominal pain, and flatulence.  There is a rare possibility of liver failure that can occur when taking terbinafine.  The patient understands that a baseline LFT and kidney function test may be required. The patient verbalized understanding of the proper use and possible adverse effects of terbinafine.  All of the patient's questions and concerns were addressed.
Albendazole Counseling:  I discussed with the patient the risks of albendazole including but not limited to cytopenia, kidney damage, nausea/vomiting and severe allergy.  The patient understands that this medication is being used in an off-label manner.
Glycopyrrolate Counseling:  I discussed with the patient the risks of glycopyrrolate including but not limited to skin rash, drowsiness, dry mouth, difficulty urinating, and blurred vision.
Rinvoq Counseling: I discussed with the patient the risks of Rinvoq therapy including but not limited to upper respiratory tract infections, shingles, cold sores, bronchitis, nausea, cough, fever, acne, and headache. Live vaccines should be avoided.  This medication has been linked to serious infections; higher rate of mortality; malignancy and lymphoproliferative disorders; major adverse cardiovascular events; thrombosis; thrombocytopenia, anemia, and neutropenia; lipid elevations; liver enzyme elevations; and gastrointestinal perforations.
Cimzia Pregnancy And Lactation Text: This medication crosses the placenta but can be considered safe in certain situations. Cimzia may be excreted in breast milk.
Cellcept Counseling:  I discussed with the patient the risks of mycophenolate mofetil including but not limited to infection/immunosuppression, GI upset, hypokalemia, hypercholesterolemia, bone marrow suppression, lymphoproliferative disorders, malignancy, GI ulceration/bleed/perforation, colitis, interstitial lung disease, kidney failure, progressive multifocal leukoencephalopathy, and birth defects.  The patient understands that monitoring is required including a baseline creatinine and regular CBC testing. In addition, patient must alert us immediately if symptoms of infection or other concerning signs are noted.
Azelaic Acid Counseling: Patient counseled that medicine may cause skin irritation and to avoid applying near the eyes.  In the event of skin irritation, the patient was advised to reduce the amount of the drug applied or use it less frequently.   The patient verbalized understanding of the proper use and possible adverse effects of azelaic acid.  All of the patient's questions and concerns were addressed.
Infliximab Counseling:  I discussed with the patient the risks of infliximab including but not limited to myelosuppression, immunosuppression, autoimmune hepatitis, demyelinating diseases, lymphoma, and serious infections.  The patient understands that monitoring is required including a PPD at baseline and must alert us or the primary physician if symptoms of infection or other concerning signs are noted.
Nsaids Pregnancy And Lactation Text: These medications are considered safe up to 30 weeks gestation. It is excreted in breast milk.
Bexarotene Counseling:  I discussed with the patient the risks of bexarotene including but not limited to hair loss, dry lips/skin/eyes, liver abnormalities, hyperlipidemia, pancreatitis, depression/suicidal ideation, photosensitivity, drug rash/allergic reactions, hypothyroidism, anemia, leukopenia, infection, cataracts, and teratogenicity.  Patient understands that they will need regular blood tests to check lipid profile, liver function tests, white blood cell count, thyroid function tests and pregnancy test if applicable.
Cephalexin Counseling: I counseled the patient regarding use of cephalexin as an antibiotic for prophylactic and/or therapeutic purposes. Cephalexin (commonly prescribed under brand name Keflex) is a cephalosporin antibiotic which is active against numerous classes of bacteria, including most skin bacteria. Side effects may include nausea, diarrhea, gastrointestinal upset, rash, hives, yeast infections, and in rare cases, hepatitis, kidney disease, seizures, fever, confusion, neurologic symptoms, and others. Patients with severe allergies to penicillin medications are cautioned that there is about a 10% incidence of cross-reactivity with cephalosporins. When possible, patients with penicillin allergies should use alternatives to cephalosporins for antibiotic therapy.
Finasteride Male Counseling: Finasteride Counseling:  I discussed with the patient the risks of use of finasteride including but not limited to decreased libido, decreased ejaculate volume, gynecomastia, and depression. Women should not handle medication.  All of the patient's questions and concerns were addressed.
VTAMA Counseling: I discussed with the patient that VTAMA is not for use in the eyes, mouth or mouth. They should call the office if they develop any signs of allergic reactions to VTAMA. The patient verbalized understanding of the proper use and possible adverse effects of VTAMA.  All of the patient's questions and concerns were addressed.
Picato Counseling:  I discussed with the patient the risks of Picato including but not limited to erythema, scaling, itching, weeping, crusting, and pain.
Dupixent Counseling: I discussed with the patient the risks of dupilumab including but not limited to eye infection and irritation, cold sores, injection site reactions, worsening of asthma, allergic reactions and increased risk of parasitic infection.  Live vaccines should be avoided while taking dupilumab. Dupilumab will also interact with certain medications such as warfarin and cyclosporine. The patient understands that monitoring is required and they must alert us or the primary physician if symptoms of infection or other concerning signs are noted.
Stelara Counseling:  I discussed with the patient the risks of ustekinumab including but not limited to immunosuppression, malignancy, posterior leukoencephalopathy syndrome, and serious infections.  The patient understands that monitoring is required including a PPD at baseline and must alert us or the primary physician if symptoms of infection or other concerning signs are noted.
Olanzapine Counseling- I discussed with the patient the common side effects of olanzapine including but are not limited to: lack of energy, dry mouth, increased appetite, sleepiness, tremor, constipation, dizziness, changes in behavior, or restlessness.  Explained that teenagers are more likely to experience headaches, abdominal pain, pain in the arms or legs, tiredness, and sleepiness.  Serious side effects include but are not limited: increased risk of death in elderly patients who are confused, have memory loss, or dementia-related psychosis; hyperglycemia; increased cholesterol and triglycerides; and weight gain.
Drysol Counseling:  I discussed with the patient the risks of drysol/aluminum chloride including but not limited to skin rash, itching, irritation, burning.
Finasteride Female Counseling: Finasteride Counseling:  I discussed with the patient the risks of use of finasteride including but not limited to decreased libido and sexual dysfunction. Explained the teratogenic nature of the medication and stressed the importance of not getting pregnant during treatment. All of the patient's questions and concerns were addressed.
Bexarotene Pregnancy And Lactation Text: This medication is Pregnancy Category X and should not be given to women who are pregnant or may become pregnant. This medication should not be used if you are breast feeding.
Propranolol Pregnancy And Lactation Text: This medication is Pregnancy Category C and it isn't known if it is safe during pregnancy. It is excreted in breast milk.
Topical Metronidazole Pregnancy And Lactation Text: This medication is Pregnancy Category B and considered safe during pregnancy.  It is also considered safe to use while breastfeeding.
Minocycline Counseling: Patient advised regarding possible photosensitivity and discoloration of the teeth, skin, lips, tongue and gums.  Patient instructed to avoid sunlight, if possible.  When exposed to sunlight, patients should wear protective clothing, sunglasses, and sunscreen.  The patient was instructed to call the office immediately if the following severe adverse effects occur:  hearing changes, easy bruising/bleeding, severe headache, or vision changes.  The patient verbalized understanding of the proper use and possible adverse effects of minocycline.  All of the patient's questions and concerns were addressed.
Soolantra Counseling: I discussed with the patients the risks of topial Soolantra. This is a medicine which decreases the number of mites and inflammation in the skin. You experience burning, stinging, eye irritation or allergic reactions.  Please call our office if you develop any problems from using this medication.
Detail Level: Simple

## 2024-07-22 SDOH — HEALTH STABILITY: PHYSICAL HEALTH: ON AVERAGE, HOW MANY DAYS PER WEEK DO YOU ENGAGE IN MODERATE TO STRENUOUS EXERCISE (LIKE A BRISK WALK)?: 4 DAYS

## 2024-07-22 SDOH — ECONOMIC STABILITY: FOOD INSECURITY: WITHIN THE PAST 12 MONTHS, YOU WORRIED THAT YOUR FOOD WOULD RUN OUT BEFORE YOU GOT MONEY TO BUY MORE.: NEVER TRUE

## 2024-07-22 SDOH — ECONOMIC STABILITY: FOOD INSECURITY: WITHIN THE PAST 12 MONTHS, THE FOOD YOU BOUGHT JUST DIDN'T LAST AND YOU DIDN'T HAVE MONEY TO GET MORE.: NEVER TRUE

## 2024-07-22 SDOH — ECONOMIC STABILITY: HOUSING INSECURITY: IN THE LAST 12 MONTHS, HOW MANY PLACES HAVE YOU LIVED?: 1

## 2024-07-22 SDOH — ECONOMIC STABILITY: INCOME INSECURITY: HOW HARD IS IT FOR YOU TO PAY FOR THE VERY BASICS LIKE FOOD, HOUSING, MEDICAL CARE, AND HEATING?: NOT HARD AT ALL

## 2024-07-22 SDOH — HEALTH STABILITY: PHYSICAL HEALTH: ON AVERAGE, HOW MANY MINUTES DO YOU ENGAGE IN EXERCISE AT THIS LEVEL?: 60 MIN

## 2024-07-22 SDOH — ECONOMIC STABILITY: INCOME INSECURITY: IN THE LAST 12 MONTHS, WAS THERE A TIME WHEN YOU WERE NOT ABLE TO PAY THE MORTGAGE OR RENT ON TIME?: NO

## 2024-07-22 ASSESSMENT — SOCIAL DETERMINANTS OF HEALTH (SDOH)
HOW OFTEN DO YOU HAVE A DRINK CONTAINING ALCOHOL: NEVER
HOW OFTEN DO YOU GET TOGETHER WITH FRIENDS OR RELATIVES?: ONCE A WEEK
HOW HARD IS IT FOR YOU TO PAY FOR THE VERY BASICS LIKE FOOD, HOUSING, MEDICAL CARE, AND HEATING?: NOT HARD AT ALL
ARE YOU MARRIED, WIDOWED, DIVORCED, SEPARATED, NEVER MARRIED, OR LIVING WITH A PARTNER?: NEVER MARRIED
DO YOU BELONG TO ANY CLUBS OR ORGANIZATIONS SUCH AS CHURCH GROUPS UNIONS, FRATERNAL OR ATHLETIC GROUPS, OR SCHOOL GROUPS?: YES
HOW OFTEN DO YOU ATTENT MEETINGS OF THE CLUB OR ORGANIZATION YOU BELONG TO?: MORE THAN 4 TIMES PER YEAR
HOW OFTEN DO YOU HAVE SIX OR MORE DRINKS ON ONE OCCASION: NEVER
HOW MANY DRINKS CONTAINING ALCOHOL DO YOU HAVE ON A TYPICAL DAY WHEN YOU ARE DRINKING: 3 OR 4
HOW OFTEN DO YOU ATTENT MEETINGS OF THE CLUB OR ORGANIZATION YOU BELONG TO?: MORE THAN 4 TIMES PER YEAR
IN THE PAST 12 MONTHS, HAS THE ELECTRIC, GAS, OIL, OR WATER COMPANY THREATENED TO SHUT OFF SERVICE IN YOUR HOME?: NO
HOW OFTEN DO YOU ATTEND CHURCH OR RELIGIOUS SERVICES?: NEVER
DO YOU BELONG TO ANY CLUBS OR ORGANIZATIONS SUCH AS CHURCH GROUPS UNIONS, FRATERNAL OR ATHLETIC GROUPS, OR SCHOOL GROUPS?: YES
IN A TYPICAL WEEK, HOW MANY TIMES DO YOU TALK ON THE PHONE WITH FAMILY, FRIENDS, OR NEIGHBORS?: ONCE A WEEK
IN A TYPICAL WEEK, HOW MANY TIMES DO YOU TALK ON THE PHONE WITH FAMILY, FRIENDS, OR NEIGHBORS?: ONCE A WEEK
WITHIN THE PAST 12 MONTHS, YOU WORRIED THAT YOUR FOOD WOULD RUN OUT BEFORE YOU GOT THE MONEY TO BUY MORE: NEVER TRUE
HOW OFTEN DO YOU GET TOGETHER WITH FRIENDS OR RELATIVES?: ONCE A WEEK
ARE YOU MARRIED, WIDOWED, DIVORCED, SEPARATED, NEVER MARRIED, OR LIVING WITH A PARTNER?: NEVER MARRIED
HOW OFTEN DO YOU ATTEND CHURCH OR RELIGIOUS SERVICES?: NEVER

## 2024-07-22 ASSESSMENT — LIFESTYLE VARIABLES
AUDIT-C TOTAL SCORE: 1
SKIP TO QUESTIONS 9-10: 0
HOW MANY STANDARD DRINKS CONTAINING ALCOHOL DO YOU HAVE ON A TYPICAL DAY: 3 OR 4
HOW OFTEN DO YOU HAVE A DRINK CONTAINING ALCOHOL: NEVER
HOW OFTEN DO YOU HAVE SIX OR MORE DRINKS ON ONE OCCASION: NEVER

## 2024-07-23 ENCOUNTER — OFFICE VISIT (OUTPATIENT)
Dept: MEDICAL GROUP | Facility: LAB | Age: 49
End: 2024-07-23
Payer: COMMERCIAL

## 2024-07-23 VITALS
TEMPERATURE: 97.2 F | HEART RATE: 64 BPM | RESPIRATION RATE: 16 BRPM | DIASTOLIC BLOOD PRESSURE: 68 MMHG | SYSTOLIC BLOOD PRESSURE: 118 MMHG | HEIGHT: 75 IN | OXYGEN SATURATION: 96 % | BODY MASS INDEX: 30.37 KG/M2 | WEIGHT: 244.27 LBS

## 2024-07-23 DIAGNOSIS — R06.83 SNORING: ICD-10-CM

## 2024-07-23 DIAGNOSIS — R53.83 OTHER FATIGUE: ICD-10-CM

## 2024-07-23 DIAGNOSIS — Z12.5 PROSTATE CANCER SCREENING: ICD-10-CM

## 2024-07-23 DIAGNOSIS — E78.5 DYSLIPIDEMIA: ICD-10-CM

## 2024-07-23 DIAGNOSIS — Z11.59 NEED FOR HEPATITIS C SCREENING TEST: ICD-10-CM

## 2024-07-23 DIAGNOSIS — F51.01 PRIMARY INSOMNIA: ICD-10-CM

## 2024-07-23 DIAGNOSIS — Z00.00 ANNUAL PHYSICAL EXAM: ICD-10-CM

## 2024-07-23 PROCEDURE — 99396 PREV VISIT EST AGE 40-64: CPT | Performed by: STUDENT IN AN ORGANIZED HEALTH CARE EDUCATION/TRAINING PROGRAM

## 2024-07-23 PROCEDURE — 3078F DIAST BP <80 MM HG: CPT | Performed by: STUDENT IN AN ORGANIZED HEALTH CARE EDUCATION/TRAINING PROGRAM

## 2024-07-23 PROCEDURE — 3074F SYST BP LT 130 MM HG: CPT | Performed by: STUDENT IN AN ORGANIZED HEALTH CARE EDUCATION/TRAINING PROGRAM

## 2024-07-23 ASSESSMENT — FIBROSIS 4 INDEX: FIB4 SCORE: 0.74

## 2024-07-23 ASSESSMENT — ENCOUNTER SYMPTOMS
CHILLS: 0
SHORTNESS OF BREATH: 0
FEVER: 0

## 2024-07-23 ASSESSMENT — PATIENT HEALTH QUESTIONNAIRE - PHQ9: CLINICAL INTERPRETATION OF PHQ2 SCORE: 0

## 2024-07-25 ENCOUNTER — APPOINTMENT (RX ONLY)
Dept: URBAN - METROPOLITAN AREA CLINIC 15 | Facility: CLINIC | Age: 49
Setting detail: DERMATOLOGY
End: 2024-07-25

## 2024-07-25 DIAGNOSIS — D18.0 HEMANGIOMA: ICD-10-CM

## 2024-07-25 DIAGNOSIS — L82.1 OTHER SEBORRHEIC KERATOSIS: ICD-10-CM

## 2024-07-25 DIAGNOSIS — L81.4 OTHER MELANIN HYPERPIGMENTATION: ICD-10-CM

## 2024-07-25 DIAGNOSIS — L57.0 ACTINIC KERATOSIS: ICD-10-CM | Status: INADEQUATELY CONTROLLED

## 2024-07-25 DIAGNOSIS — Z71.89 OTHER SPECIFIED COUNSELING: ICD-10-CM

## 2024-07-25 DIAGNOSIS — D22 MELANOCYTIC NEVI: ICD-10-CM

## 2024-07-25 PROBLEM — D18.01 HEMANGIOMA OF SKIN AND SUBCUTANEOUS TISSUE: Status: ACTIVE | Noted: 2024-07-25

## 2024-07-25 PROBLEM — D22.71 MELANOCYTIC NEVI OF RIGHT LOWER LIMB, INCLUDING HIP: Status: ACTIVE | Noted: 2024-07-25

## 2024-07-25 PROCEDURE — ? SUNSCREEN RECOMMENDATIONS

## 2024-07-25 PROCEDURE — ? LIQUID NITROGEN

## 2024-07-25 PROCEDURE — ? ADDITIONAL NOTES

## 2024-07-25 PROCEDURE — ? COUNSELING

## 2024-07-25 PROCEDURE — 99213 OFFICE O/P EST LOW 20 MIN: CPT | Mod: 25

## 2024-07-25 PROCEDURE — 17003 DESTRUCT PREMALG LES 2-14: CPT

## 2024-07-25 PROCEDURE — 17000 DESTRUCT PREMALG LESION: CPT

## 2024-07-25 ASSESSMENT — LOCATION DETAILED DESCRIPTION DERM
LOCATION DETAILED: LEFT PROXIMAL DORSAL FOREARM
LOCATION DETAILED: RIGHT PROXIMAL CALF
LOCATION DETAILED: LEFT ANTERIOR DISTAL UPPER ARM
LOCATION DETAILED: LEFT SUPERIOR PARIETAL SCALP
LOCATION DETAILED: RIGHT MEDIAL INFERIOR CHEST
LOCATION DETAILED: RIGHT SUPERIOR PARIETAL SCALP
LOCATION DETAILED: RIGHT INFERIOR FOREHEAD
LOCATION DETAILED: INFERIOR MID FOREHEAD
LOCATION DETAILED: POSTERIOR MID-PARIETAL SCALP
LOCATION DETAILED: RIGHT INFERIOR MEDIAL FOREHEAD

## 2024-07-25 ASSESSMENT — LOCATION SIMPLE DESCRIPTION DERM
LOCATION SIMPLE: LEFT FOREARM
LOCATION SIMPLE: SCALP
LOCATION SIMPLE: LEFT UPPER ARM
LOCATION SIMPLE: RIGHT FOREHEAD
LOCATION SIMPLE: INFERIOR FOREHEAD
LOCATION SIMPLE: CHEST
LOCATION SIMPLE: RIGHT CALF
LOCATION SIMPLE: POSTERIOR SCALP

## 2024-07-25 ASSESSMENT — LOCATION ZONE DERM
LOCATION ZONE: ARM
LOCATION ZONE: TRUNK
LOCATION ZONE: SCALP
LOCATION ZONE: LEG
LOCATION ZONE: FACE

## 2024-07-25 NOTE — PROCEDURE: ADDITIONAL NOTES
Detail Level: Detailed
Additional Notes: Discussed using the Luis/5Fu cream for the forehead and scalp in October.
Render Risk Assessment In Note?: no

## 2024-07-25 NOTE — PROCEDURE: LIQUID NITROGEN
Number Of Freeze-Thaw Cycles: 2 freeze-thaw cycles
Render Post-Care Instructions In Note?: yes
Consent: The patient's consent was obtained including but not limited to risks of crusting, scabbing, blistering, scarring, darker or lighter pigmentary change, recurrence, incomplete removal and infection.
Show Aperture Variable?: No
Detail Level: Zone
Application Tool (Optional): Cry-AC
Post-Care Instructions: I reviewed with the patient in detail post-care instructions. Patient is to wear sunprotection, and avoid picking at any of the treated lesions. Pt may apply Vaseline to crusted or scabbing areas.
Duration Of Freeze Thaw-Cycle (Seconds): 3

## 2024-07-25 NOTE — PROCEDURE: SUNSCREEN RECOMMENDATIONS
Detail Level: Detailed
General Sunscreen Counseling: I recommended a broad spectrum sunscreen with a SPF of 30 or higher.  I explained that SPF 30 sunscreens block approximately 97 percent of the sun's harmful rays.  Sunscreens should be applied at least 15 minutes prior to expected sun exposure and then every 2 hours after that as long as sun exposure continues. If swimming or exercising sunscreen should be reapplied every 45 minutes to an hour after getting wet or sweating.  One ounce, or the equivalent of a shot glass full of sunscreen, is adequate to protect the skin not covered by a bathing suit. I also recommended a lip balm with a sunscreen as well. Sun protective clothing can be used in lieu of sunscreen but must be worn the entire time you are exposed to the sun's rays.
Split-Thickness Skin Graft Text: The defect edges were debeveled with a #15 scalpel blade.  Given the location of the defect, shape of the defect and the proximity to free margins a split thickness skin graft was deemed most appropriate.  Using a sterile surgical marker, the primary defect shape was transferred to the donor site. The split thickness graft was then harvested.  The skin graft was then placed in the primary defect and oriented appropriately.

## 2024-07-26 ENCOUNTER — HOSPITAL ENCOUNTER (OUTPATIENT)
Dept: LAB | Facility: MEDICAL CENTER | Age: 49
End: 2024-07-26
Attending: STUDENT IN AN ORGANIZED HEALTH CARE EDUCATION/TRAINING PROGRAM
Payer: COMMERCIAL

## 2024-07-26 DIAGNOSIS — E78.5 DYSLIPIDEMIA: ICD-10-CM

## 2024-07-26 DIAGNOSIS — Z00.00 ANNUAL PHYSICAL EXAM: ICD-10-CM

## 2024-07-26 DIAGNOSIS — Z11.59 NEED FOR HEPATITIS C SCREENING TEST: ICD-10-CM

## 2024-07-26 DIAGNOSIS — R53.83 OTHER FATIGUE: ICD-10-CM

## 2024-07-26 DIAGNOSIS — Z12.5 PROSTATE CANCER SCREENING: ICD-10-CM

## 2024-07-26 LAB
ALBUMIN SERPL BCP-MCNC: 4.4 G/DL (ref 3.2–4.9)
ALBUMIN/GLOB SERPL: 1.5 G/DL
ALP SERPL-CCNC: 99 U/L (ref 30–99)
ALT SERPL-CCNC: 32 U/L (ref 2–50)
ANION GAP SERPL CALC-SCNC: 11 MMOL/L (ref 7–16)
AST SERPL-CCNC: 25 U/L (ref 12–45)
BASOPHILS # BLD AUTO: 0.9 % (ref 0–1.8)
BASOPHILS # BLD: 0.05 K/UL (ref 0–0.12)
BILIRUB SERPL-MCNC: 0.8 MG/DL (ref 0.1–1.5)
BUN SERPL-MCNC: 18 MG/DL (ref 8–22)
CALCIUM ALBUM COR SERPL-MCNC: 9.3 MG/DL (ref 8.5–10.5)
CALCIUM SERPL-MCNC: 9.6 MG/DL (ref 8.5–10.5)
CHLORIDE SERPL-SCNC: 102 MMOL/L (ref 96–112)
CO2 SERPL-SCNC: 25 MMOL/L (ref 20–33)
CREAT SERPL-MCNC: 1.25 MG/DL (ref 0.5–1.4)
EOSINOPHIL # BLD AUTO: 0.28 K/UL (ref 0–0.51)
EOSINOPHIL NFR BLD: 4.9 % (ref 0–6.9)
ERYTHROCYTE [DISTWIDTH] IN BLOOD BY AUTOMATED COUNT: 37.9 FL (ref 35.9–50)
EST. AVERAGE GLUCOSE BLD GHB EST-MCNC: 103 MG/DL
GFR SERPLBLD CREATININE-BSD FMLA CKD-EPI: 71 ML/MIN/1.73 M 2
GLOBULIN SER CALC-MCNC: 2.9 G/DL (ref 1.9–3.5)
GLUCOSE SERPL-MCNC: 93 MG/DL (ref 65–99)
HBA1C MFR BLD: 5.2 % (ref 4–5.6)
HCT VFR BLD AUTO: 46.1 % (ref 42–52)
HCV AB SER QL: NORMAL
HGB BLD-MCNC: 15.7 G/DL (ref 14–18)
IMM GRANULOCYTES # BLD AUTO: 0.01 K/UL (ref 0–0.11)
IMM GRANULOCYTES NFR BLD AUTO: 0.2 % (ref 0–0.9)
LYMPHOCYTES # BLD AUTO: 2 K/UL (ref 1–4.8)
LYMPHOCYTES NFR BLD: 35.3 % (ref 22–41)
MCH RBC QN AUTO: 28.3 PG (ref 27–33)
MCHC RBC AUTO-ENTMCNC: 34.1 G/DL (ref 32.3–36.5)
MCV RBC AUTO: 83.1 FL (ref 81.4–97.8)
MONOCYTES # BLD AUTO: 0.47 K/UL (ref 0–0.85)
MONOCYTES NFR BLD AUTO: 8.3 % (ref 0–13.4)
NEUTROPHILS # BLD AUTO: 2.85 K/UL (ref 1.82–7.42)
NEUTROPHILS NFR BLD: 50.4 % (ref 44–72)
NRBC # BLD AUTO: 0 K/UL
NRBC BLD-RTO: 0 /100 WBC (ref 0–0.2)
PLATELET # BLD AUTO: 225 K/UL (ref 164–446)
PMV BLD AUTO: 11.8 FL (ref 9–12.9)
POTASSIUM SERPL-SCNC: 4.3 MMOL/L (ref 3.6–5.5)
PROT SERPL-MCNC: 7.3 G/DL (ref 6–8.2)
PSA SERPL-MCNC: 0.24 NG/ML (ref 0–4)
RBC # BLD AUTO: 5.55 M/UL (ref 4.7–6.1)
SODIUM SERPL-SCNC: 138 MMOL/L (ref 135–145)
TESTOST SERPL-MCNC: 358 NG/DL (ref 175–781)
TSH SERPL DL<=0.005 MIU/L-ACNC: 2.62 UIU/ML (ref 0.38–5.33)
WBC # BLD AUTO: 5.7 K/UL (ref 4.8–10.8)

## 2024-07-26 PROCEDURE — 83036 HEMOGLOBIN GLYCOSYLATED A1C: CPT

## 2024-07-26 PROCEDURE — 84403 ASSAY OF TOTAL TESTOSTERONE: CPT

## 2024-07-26 PROCEDURE — 85025 COMPLETE CBC W/AUTO DIFF WBC: CPT

## 2024-07-26 PROCEDURE — 86803 HEPATITIS C AB TEST: CPT

## 2024-07-26 PROCEDURE — 84153 ASSAY OF PSA TOTAL: CPT

## 2024-07-26 PROCEDURE — 36415 COLL VENOUS BLD VENIPUNCTURE: CPT

## 2024-07-26 PROCEDURE — 84443 ASSAY THYROID STIM HORMONE: CPT

## 2024-07-26 PROCEDURE — 80061 LIPID PANEL: CPT

## 2024-07-26 PROCEDURE — 82172 ASSAY OF APOLIPOPROTEIN: CPT

## 2024-07-26 PROCEDURE — 80053 COMPREHEN METABOLIC PANEL: CPT

## 2024-07-26 PROCEDURE — 83704 LIPOPROTEIN BLD QUAN PART: CPT

## 2024-07-29 LAB — APO B100 SERPL-MCNC: 157 MG/DL (ref 66–133)

## 2024-07-30 LAB
CHOLEST SERPL-MCNC: 299 MG/DL
HDL PARTICAL NO Q4363: 30 UMOL/L
HDL SIZE Q4361: 8.4 NM
HDLC SERPL-MCNC: 37 MG/DL (ref 40–59)
HLD.LARGE SERPL-SCNC: <2.8 UMOL/L
L VLDL PART NO Q4357: 2.9 NMOL/L
LDL SERPL QN: 19.9 NM
LDL SERPL-SCNC: 2451 NMOL/L
LDL SMALL SERPL-SCNC: >1085 NMOL/L
LDLC SERPL CALC-MCNC: 194 MG/DL
PATHOLOGY STUDY: ABNORMAL
TRIGL SERPL-MCNC: 342 MG/DL (ref 30–149)
VLDL SIZE Q4362: 50.1 NM

## 2024-08-01 ENCOUNTER — TELEPHONE (OUTPATIENT)
Dept: MEDICAL GROUP | Facility: LAB | Age: 49
End: 2024-08-01
Payer: COMMERCIAL

## 2024-08-01 NOTE — TELEPHONE ENCOUNTER
----- Message from Syed Raymond D.O. sent at 8/1/2024  6:45 AM PDT -----  Appointment please, virtual is okay

## 2024-08-07 ENCOUNTER — TELEMEDICINE (OUTPATIENT)
Dept: MEDICAL GROUP | Facility: LAB | Age: 49
End: 2024-08-07
Payer: COMMERCIAL

## 2024-08-07 DIAGNOSIS — E78.5 DYSLIPIDEMIA: ICD-10-CM

## 2024-08-07 PROCEDURE — 99214 OFFICE O/P EST MOD 30 MIN: CPT | Mod: 95 | Performed by: STUDENT IN AN ORGANIZED HEALTH CARE EDUCATION/TRAINING PROGRAM

## 2024-08-07 RX ORDER — ROSUVASTATIN CALCIUM 20 MG/1
20 TABLET, COATED ORAL EVERY EVENING
Qty: 60 TABLET | Refills: 1 | Status: SHIPPED | OUTPATIENT
Start: 2024-08-07

## 2024-08-07 ASSESSMENT — ENCOUNTER SYMPTOMS
SHORTNESS OF BREATH: 0
CHILLS: 0
FEVER: 0

## 2024-08-08 NOTE — ASSESSMENT & PLAN NOTE
Chronic-not controlled  - Start rosuvastatin 20 mg, medication side effects discussed with patient  - Recheck lipid panel and CMP in 3 months

## 2024-08-08 NOTE — PROGRESS NOTES
Subjective:   This evaluation was conducted via teams using secure and encrypted videoconferencing technology. The patient was in a private location in the state of [place].    The patient's identity was confirmed and verbal consent was obtained for this virtual visit.    CC:   Chief Complaint   Patient presents with    Results        HPI:       Problem   Dyslipidemia     Family history of cholesterol     8/7/2024  - Patient's small LDL is greater than 1000 and LDL particles are greater than the 95th percentile.  Apolipoprotein B is also elevated  - Patient reports he refrains from ultra processed foods and eats fairly balanced diet  -CT cardiac score completed in 2023 did not report any detectable hard plaque         Current Outpatient Medications Ordered in Epic   Medication Sig Dispense Refill    rosuvastatin (CRESTOR) 20 MG Tab Take 1 Tablet by mouth every evening. 60 Tablet 1    SUMAtriptan (IMITREX) 50 MG Tab Take 1 Tablet by mouth one time as needed for Migraine for up to 1 dose. If symptoms persist or return, may repeat dose (usually same as first dose) after =2 hours. Maximum dose: 100 mg/dose; 200 mg per 24 hours 10 Tablet 3    albuterol 108 (90 Base) MCG/ACT Aero Soln inhalation aerosol Inhale 2 Puffs by mouth every 6 hours as needed for Shortness of Breath. 8.5 g 5     No current [facility]-ordered facility-administered medications on file.           ROS:  Review of Systems   Constitutional:  Negative for chills and fever.   Respiratory:  Negative for shortness of breath.    Cardiovascular:  Negative for chest pain.       Objective:     Exam:  There were no vitals taken for this visit. There is no height or weight on file to calculate BMI.    Physical Exam  Constitutional:       General: He is not in acute distress.     Appearance: He is not ill-appearing.   Pulmonary:      Effort: Pulmonary effort is normal.   Neurological:      Mental Status: He is alert.   Psychiatric:         Mood and Affect: Mood normal.          Behavior: Behavior normal.         Thought Content: Thought content normal.         Judgment: Judgment normal.                   Assessment & Plan:     Problem List Items Addressed This Visit       Dyslipidemia     Chronic-not controlled  - Start rosuvastatin 20 mg, medication side effects discussed with patient  - Recheck lipid panel and CMP in 3 months         Relevant Medications    rosuvastatin (CRESTOR) 20 MG Tab    Other Relevant Orders    Lipid Profile    Comp Metabolic Panel           Please note that this dictation was created using voice recognition software. I have made every reasonable attempt to correct obvious errors, but I expect that there are errors of grammar and possibly content that I did not discover before finalizing the note.

## 2024-08-15 ENCOUNTER — TELEPHONE (OUTPATIENT)
Dept: HEALTH INFORMATION MANAGEMENT | Facility: OTHER | Age: 49
End: 2024-08-15
Payer: COMMERCIAL

## 2024-11-13 ENCOUNTER — HOSPITAL ENCOUNTER (OUTPATIENT)
Dept: LAB | Facility: MEDICAL CENTER | Age: 49
End: 2024-11-13
Attending: STUDENT IN AN ORGANIZED HEALTH CARE EDUCATION/TRAINING PROGRAM
Payer: COMMERCIAL

## 2024-11-13 DIAGNOSIS — E78.5 DYSLIPIDEMIA: ICD-10-CM

## 2024-11-13 PROCEDURE — 80061 LIPID PANEL: CPT

## 2024-11-13 PROCEDURE — 80053 COMPREHEN METABOLIC PANEL: CPT

## 2024-11-13 PROCEDURE — 36415 COLL VENOUS BLD VENIPUNCTURE: CPT

## 2024-11-14 LAB
ALBUMIN SERPL BCP-MCNC: 4.7 G/DL (ref 3.2–4.9)
ALBUMIN/GLOB SERPL: 1.8 G/DL
ALP SERPL-CCNC: 94 U/L (ref 30–99)
ALT SERPL-CCNC: 28 U/L (ref 2–50)
ANION GAP SERPL CALC-SCNC: 11 MMOL/L (ref 7–16)
AST SERPL-CCNC: 25 U/L (ref 12–45)
BILIRUB SERPL-MCNC: 0.6 MG/DL (ref 0.1–1.5)
BUN SERPL-MCNC: 21 MG/DL (ref 8–22)
CALCIUM ALBUM COR SERPL-MCNC: 8.8 MG/DL (ref 8.5–10.5)
CALCIUM SERPL-MCNC: 9.4 MG/DL (ref 8.5–10.5)
CHLORIDE SERPL-SCNC: 101 MMOL/L (ref 96–112)
CHOLEST SERPL-MCNC: 155 MG/DL (ref 100–199)
CO2 SERPL-SCNC: 26 MMOL/L (ref 20–33)
CREAT SERPL-MCNC: 1.27 MG/DL (ref 0.5–1.4)
GFR SERPLBLD CREATININE-BSD FMLA CKD-EPI: 70 ML/MIN/1.73 M 2
GLOBULIN SER CALC-MCNC: 2.6 G/DL (ref 1.9–3.5)
GLUCOSE SERPL-MCNC: 90 MG/DL (ref 65–99)
HDLC SERPL-MCNC: 45 MG/DL
LDLC SERPL CALC-MCNC: 94 MG/DL
POTASSIUM SERPL-SCNC: 4.5 MMOL/L (ref 3.6–5.5)
PROT SERPL-MCNC: 7.3 G/DL (ref 6–8.2)
SODIUM SERPL-SCNC: 138 MMOL/L (ref 135–145)
TRIGL SERPL-MCNC: 80 MG/DL (ref 0–149)

## 2024-12-04 DIAGNOSIS — E78.5 DYSLIPIDEMIA: ICD-10-CM

## 2024-12-04 NOTE — TELEPHONE ENCOUNTER
Received request via: Pharmacy    Was the patient seen in the last year in this department? Yes    Does the patient have an active prescription (recently filled or refills available) for medication(s) requested? No    [facility] Name[facility]     Does the patient have [facility] and need 100-day supply? (This applies to ALL medications) Patient does not have [facility]

## 2024-12-05 RX ORDER — ROSUVASTATIN CALCIUM 20 MG/1
20 TABLET, COATED ORAL EVERY EVENING
Qty: 90 TABLET | Refills: 1 | Status: SHIPPED | OUTPATIENT
Start: 2024-12-05

## 2024-12-19 ENCOUNTER — APPOINTMENT (OUTPATIENT)
Dept: URBAN - METROPOLITAN AREA CLINIC 15 | Facility: CLINIC | Age: 49
Setting detail: DERMATOLOGY
End: 2024-12-19

## 2024-12-19 DIAGNOSIS — D22 MELANOCYTIC NEVI: ICD-10-CM

## 2024-12-19 DIAGNOSIS — Z71.89 OTHER SPECIFIED COUNSELING: ICD-10-CM

## 2024-12-19 DIAGNOSIS — L57.0 ACTINIC KERATOSIS: ICD-10-CM | Status: RESOLVING

## 2024-12-19 DIAGNOSIS — D18.0 HEMANGIOMA: ICD-10-CM

## 2024-12-19 DIAGNOSIS — L82.1 OTHER SEBORRHEIC KERATOSIS: ICD-10-CM

## 2024-12-19 DIAGNOSIS — L81.8 OTHER SPECIFIED DISORDERS OF PIGMENTATION: ICD-10-CM

## 2024-12-19 DIAGNOSIS — L81.4 OTHER MELANIN HYPERPIGMENTATION: ICD-10-CM

## 2024-12-19 PROBLEM — D22.71 MELANOCYTIC NEVI OF RIGHT LOWER LIMB, INCLUDING HIP: Status: ACTIVE | Noted: 2024-12-19

## 2024-12-19 PROBLEM — D18.01 HEMANGIOMA OF SKIN AND SUBCUTANEOUS TISSUE: Status: ACTIVE | Noted: 2024-12-19

## 2024-12-19 PROCEDURE — ? TREATMENT REGIMEN

## 2024-12-19 PROCEDURE — ? SUNSCREEN RECOMMENDATIONS

## 2024-12-19 PROCEDURE — 99213 OFFICE O/P EST LOW 20 MIN: CPT

## 2024-12-19 PROCEDURE — ? COUNSELING

## 2024-12-19 ASSESSMENT — LOCATION ZONE DERM
LOCATION ZONE: TRUNK
LOCATION ZONE: ARM
LOCATION ZONE: SCALP
LOCATION ZONE: LEG

## 2024-12-19 ASSESSMENT — LOCATION DETAILED DESCRIPTION DERM
LOCATION DETAILED: RIGHT MEDIAL INFERIOR CHEST
LOCATION DETAILED: RIGHT CENTRAL FRONTAL SCALP
LOCATION DETAILED: RIGHT PROXIMAL CALF
LOCATION DETAILED: LEFT PROXIMAL DORSAL FOREARM
LOCATION DETAILED: LEFT ANTERIOR DISTAL UPPER ARM
LOCATION DETAILED: RIGHT MEDIAL FRONTAL SCALP

## 2024-12-19 ASSESSMENT — LOCATION SIMPLE DESCRIPTION DERM
LOCATION SIMPLE: RIGHT CALF
LOCATION SIMPLE: LEFT FOREARM
LOCATION SIMPLE: CHEST
LOCATION SIMPLE: LEFT UPPER ARM
LOCATION SIMPLE: RIGHT SCALP

## 2024-12-19 NOTE — PROCEDURE: TREATMENT REGIMEN
Detail Level: Detailed
Discontinue Regimen: Hold: Luis / 5fu Bid\\nCal / 5fu (20g 5% fluorouracil, 20g 0.005% calcipotriene)\\nSig: Apply to scalp twice daily for 5 days.

## 2024-12-19 NOTE — PROCEDURE: COUNSELING
Detail Level: Zone
Detail Level: Generalized
Sunscreen Recommendation Label Override: Broad Spectrum Sunscreen minimum SPF 30+
Detail Level: Detailed

## 2025-06-09 DIAGNOSIS — E78.5 DYSLIPIDEMIA: ICD-10-CM

## 2025-06-09 RX ORDER — ROSUVASTATIN CALCIUM 20 MG/1
20 TABLET, COATED ORAL EVERY EVENING
Qty: 90 TABLET | Refills: 1 | Status: SHIPPED | OUTPATIENT
Start: 2025-06-09

## 2025-07-21 SDOH — HEALTH STABILITY: PHYSICAL HEALTH: ON AVERAGE, HOW MANY MINUTES DO YOU ENGAGE IN EXERCISE AT THIS LEVEL?: 90 MIN

## 2025-07-21 SDOH — ECONOMIC STABILITY: FOOD INSECURITY: WITHIN THE PAST 12 MONTHS, YOU WORRIED THAT YOUR FOOD WOULD RUN OUT BEFORE YOU GOT MONEY TO BUY MORE.: NEVER TRUE

## 2025-07-21 SDOH — ECONOMIC STABILITY: FOOD INSECURITY: WITHIN THE PAST 12 MONTHS, THE FOOD YOU BOUGHT JUST DIDN'T LAST AND YOU DIDN'T HAVE MONEY TO GET MORE.: NEVER TRUE

## 2025-07-21 SDOH — HEALTH STABILITY: PHYSICAL HEALTH: ON AVERAGE, HOW MANY DAYS PER WEEK DO YOU ENGAGE IN MODERATE TO STRENUOUS EXERCISE (LIKE A BRISK WALK)?: 6 DAYS

## 2025-07-21 SDOH — ECONOMIC STABILITY: INCOME INSECURITY: HOW HARD IS IT FOR YOU TO PAY FOR THE VERY BASICS LIKE FOOD, HOUSING, MEDICAL CARE, AND HEATING?: NOT HARD AT ALL

## 2025-07-21 SDOH — ECONOMIC STABILITY: INCOME INSECURITY: IN THE LAST 12 MONTHS, WAS THERE A TIME WHEN YOU WERE NOT ABLE TO PAY THE MORTGAGE OR RENT ON TIME?: NO

## 2025-07-21 SDOH — HEALTH STABILITY: MENTAL HEALTH
STRESS IS WHEN SOMEONE FEELS TENSE, NERVOUS, ANXIOUS, OR CAN'T SLEEP AT NIGHT BECAUSE THEIR MIND IS TROUBLED. HOW STRESSED ARE YOU?: ONLY A LITTLE

## 2025-07-21 ASSESSMENT — SOCIAL DETERMINANTS OF HEALTH (SDOH)
HOW OFTEN DO YOU GET TOGETHER WITH FRIENDS OR RELATIVES?: NEVER
HOW OFTEN DO YOU HAVE SIX OR MORE DRINKS ON ONE OCCASION: NEVER
IN A TYPICAL WEEK, HOW MANY TIMES DO YOU TALK ON THE PHONE WITH FAMILY, FRIENDS, OR NEIGHBORS?: TWICE A WEEK
HOW OFTEN DO YOU ATTENT MEETINGS OF THE CLUB OR ORGANIZATION YOU BELONG TO?: NEVER
HOW OFTEN DO YOU GET TOGETHER WITH FRIENDS OR RELATIVES?: NEVER
ARE YOU MARRIED, WIDOWED, DIVORCED, SEPARATED, NEVER MARRIED, OR LIVING WITH A PARTNER?: NEVER MARRIED
HOW OFTEN DO YOU ATTEND CHURCH OR RELIGIOUS SERVICES?: NEVER
HOW OFTEN DO YOU ATTEND CHURCH OR RELIGIOUS SERVICES?: NEVER
DO YOU BELONG TO ANY CLUBS OR ORGANIZATIONS SUCH AS CHURCH GROUPS UNIONS, FRATERNAL OR ATHLETIC GROUPS, OR SCHOOL GROUPS?: NO
HOW OFTEN DO YOU HAVE A DRINK CONTAINING ALCOHOL: MONTHLY OR LESS
HOW MANY DRINKS CONTAINING ALCOHOL DO YOU HAVE ON A TYPICAL DAY WHEN YOU ARE DRINKING: 1 OR 2
HOW HARD IS IT FOR YOU TO PAY FOR THE VERY BASICS LIKE FOOD, HOUSING, MEDICAL CARE, AND HEATING?: NOT HARD AT ALL
HOW OFTEN DO YOU ATTENT MEETINGS OF THE CLUB OR ORGANIZATION YOU BELONG TO?: NEVER
ARE YOU MARRIED, WIDOWED, DIVORCED, SEPARATED, NEVER MARRIED, OR LIVING WITH A PARTNER?: NEVER MARRIED
WITHIN THE PAST 12 MONTHS, YOU WORRIED THAT YOUR FOOD WOULD RUN OUT BEFORE YOU GOT THE MONEY TO BUY MORE: NEVER TRUE
IN A TYPICAL WEEK, HOW MANY TIMES DO YOU TALK ON THE PHONE WITH FAMILY, FRIENDS, OR NEIGHBORS?: TWICE A WEEK
DO YOU BELONG TO ANY CLUBS OR ORGANIZATIONS SUCH AS CHURCH GROUPS UNIONS, FRATERNAL OR ATHLETIC GROUPS, OR SCHOOL GROUPS?: NO
IN THE PAST 12 MONTHS, HAS THE ELECTRIC, GAS, OIL, OR WATER COMPANY THREATENED TO SHUT OFF SERVICE IN YOUR HOME?: NO

## 2025-07-21 ASSESSMENT — LIFESTYLE VARIABLES
AUDIT-C TOTAL SCORE: 1
HOW MANY STANDARD DRINKS CONTAINING ALCOHOL DO YOU HAVE ON A TYPICAL DAY: 1 OR 2
SKIP TO QUESTIONS 9-10: 1
HOW OFTEN DO YOU HAVE SIX OR MORE DRINKS ON ONE OCCASION: NEVER
HOW OFTEN DO YOU HAVE A DRINK CONTAINING ALCOHOL: MONTHLY OR LESS

## 2025-07-24 ENCOUNTER — OFFICE VISIT (OUTPATIENT)
Dept: MEDICAL GROUP | Facility: LAB | Age: 50
End: 2025-07-24
Payer: COMMERCIAL

## 2025-07-24 VITALS
RESPIRATION RATE: 18 BRPM | HEIGHT: 75 IN | WEIGHT: 242.73 LBS | SYSTOLIC BLOOD PRESSURE: 118 MMHG | HEART RATE: 72 BPM | BODY MASS INDEX: 30.18 KG/M2 | OXYGEN SATURATION: 97 % | TEMPERATURE: 98.6 F | DIASTOLIC BLOOD PRESSURE: 72 MMHG

## 2025-07-24 DIAGNOSIS — R53.83 OTHER FATIGUE: Primary | ICD-10-CM

## 2025-07-24 DIAGNOSIS — R06.83 SNORING: ICD-10-CM

## 2025-07-24 DIAGNOSIS — Z23 NEED FOR VACCINATION: ICD-10-CM

## 2025-07-24 DIAGNOSIS — E78.5 DYSLIPIDEMIA: ICD-10-CM

## 2025-07-24 RX ORDER — TESTOSTERONE CYPIONATE 200 MG/ML
120 INJECTION, SOLUTION INTRAMUSCULAR
COMMUNITY

## 2025-07-24 ASSESSMENT — PATIENT HEALTH QUESTIONNAIRE - PHQ9: CLINICAL INTERPRETATION OF PHQ2 SCORE: 0

## 2025-07-24 ASSESSMENT — ENCOUNTER SYMPTOMS
CHILLS: 0
SHORTNESS OF BREATH: 0
FEVER: 0

## 2025-07-24 ASSESSMENT — FIBROSIS 4 INDEX: FIB4 SCORE: 1.007905261357939272

## 2025-07-24 NOTE — PROGRESS NOTES
"Subjective:     Subjective:     CC:   Chief Complaint   Patient presents with    Annual Exam       HPI:   Gabriel Blair is a 50 y.o. male who presents for annual exam    Last Colorectal Cancer Screening: scheduled   Last Tdap: 2014  Received HPV series: No  Hx STDs: No     Exercise: strenuous regular exercise, aerobic > 3 hours a week  Diet: eats at home, avoids carbs     He  has a past medical history of Allergy, Asthma, and Migraine.  He  has a past surgical history that includes open reduction and eye surgery.    Family History   Problem Relation Age of Onset    Stroke Mother     Arthritis Mother     Hypertension Mother     Hyperlipidemia Mother     Colon Cancer Father     Psychiatric Illness Brother     Alcohol/Drug Brother      Social History[1]  He  reports being sexually active.    Patient Active Problem List    Diagnosis Date Noted    Other fatigue 07/24/2025    Snoring 07/23/2024    Primary insomnia 07/23/2024    Dyslipidemia 07/19/2023    Vitamin D deficiency 06/27/2023    Overweight (BMI 25.0-29.9) 06/27/2023    Chronic left-sided low back pain without sciatica 01/21/2019    Mild intermittent asthma without complication 11/30/2016    Cluster headache syndrome 11/30/2016     Current Medications[2]  Allergies[3]    Review of Systems   Review of Systems   Constitutional:  Negative for chills and fever.   Respiratory:  Negative for shortness of breath.    Cardiovascular:  Negative for chest pain.        Objective:   /72 (BP Location: Right arm, Patient Position: Sitting, BP Cuff Size: Large adult)   Pulse 72   Temp 37 °C (98.6 °F) (Temporal)   Resp 18   Ht 1.892 m (6' 2.5\")   Wt 110 kg (242 lb 11.6 oz)   SpO2 97%   BMI 30.75 kg/m²      Wt Readings from Last 4 Encounters:   07/24/25 110 kg (242 lb 11.6 oz)   07/23/24 111 kg (244 lb 4.3 oz)   10/24/23 107 kg (234 lb 12.6 oz)   07/19/23 107 kg (235 lb 9.6 oz)           Physical Exam:  Physical Exam        Assessment and Plan:     Problem List " Items Addressed This Visit       Dyslipidemia    Relevant Orders    Lipid Profile    Other fatigue - Primary    Snoring    Chronic   -stop bang score of 4 (male, BMI, snoring, feeling tired)         Relevant Orders    Overnight Home Sleep Study     Other Visit Diagnoses         Need for vaccination        Relevant Orders    Tdap =>6yo IM             Health maintenance:    Labs per orders  Immunizations per orders  Age-appropriate guidance discussed including diet and exercise  Discussed 1 year annual     Follow-up: 1 year annual            [1]   Social History  Tobacco Use    Smoking status: Never    Smokeless tobacco: Never   Vaping Use    Vaping status: Some Days   Substance Use Topics    Alcohol use: Yes     Alcohol/week: 0.0 oz     Comment: occasional    Drug use: No   [2]   Current Outpatient Medications   Medication Sig Dispense Refill    testosterone cypionate (DEPO-TESTOSTERONE) 200 MG/ML injection Inject 120 mg into the shoulder, thigh, or buttocks every 7 days.      rosuvastatin (CRESTOR) 20 MG Tab TAKE 1 TABLET BY MOUTH EVERY EVENING 90 Tablet 1    SUMAtriptan (IMITREX) 50 MG Tab Take 1 Tablet by mouth one time as needed for Migraine for up to 1 dose. If symptoms persist or return, may repeat dose (usually same as first dose) after =2 hours. Maximum dose: 100 mg/dose; 200 mg per 24 hours 10 Tablet 3    albuterol 108 (90 Base) MCG/ACT Aero Soln inhalation aerosol Inhale 2 Puffs by mouth every 6 hours as needed for Shortness of Breath. 8.5 g 5     No current facility-administered medications for this visit.   [3]   Allergies  Allergen Reactions    Pollen Extract Rash, Itching and Swelling     Break out on arms    Penicillins

## 2025-07-30 NOTE — Clinical Note
REFERRAL APPROVAL NOTICE         Sent on July 30, 2025                   Gabriel Blair  [address]  [place] [address]                   Dear Mr. Blair,    After a careful review of the medical information and benefit coverage, [facility] has processed your referral. See below for additional details.    If applicable, you must be actively enrolled with your insurance for coverage of the authorized service. If you have any questions regarding your coverage, please contact your insurance directly.    REFERRAL INFORMATION   Referral #:  [id number]  Referred-To Department    Referred-By Provider:  Pulmonary and Sleep Medicine    Syed Raymond D.O.   [facility]      [address]  [place] [address]  [phone] [address]  [place] [id number] [phone]    Referral Start Date:  07/24/2025  Referral End Date:   07/24/2026             SCHEDULING  If you do not already have an appointment, please call [phone] to make an appointment.     MORE INFORMATION  If you do not already have a [employer] account, sign up at: [email or web address]  You can access your medical information, make appointments, see lab results, billing information, and more.  If you have questions regarding this referral, please contact  the [facility] Referrals department at:             [phone]. Monday - Friday 8:00AM - 5:00PM.     Sincerely,    [facility]

## 2025-08-04 ENCOUNTER — HOSPITAL ENCOUNTER (OUTPATIENT)
Dept: LAB | Facility: MEDICAL CENTER | Age: 50
End: 2025-08-04
Attending: STUDENT IN AN ORGANIZED HEALTH CARE EDUCATION/TRAINING PROGRAM
Payer: COMMERCIAL

## 2025-08-04 DIAGNOSIS — E78.5 DYSLIPIDEMIA: ICD-10-CM

## 2025-08-04 LAB
CHOLEST SERPL-MCNC: 145 MG/DL (ref 100–199)
FASTING STATUS PATIENT QL REPORTED: NORMAL
HDLC SERPL-MCNC: 41 MG/DL
LDLC SERPL CALC-MCNC: 79 MG/DL
TRIGL SERPL-MCNC: 127 MG/DL (ref 0–149)

## 2025-08-04 PROCEDURE — 36415 COLL VENOUS BLD VENIPUNCTURE: CPT

## 2025-08-04 PROCEDURE — 80061 LIPID PANEL: CPT
